# Patient Record
Sex: FEMALE | Race: WHITE | NOT HISPANIC OR LATINO
[De-identification: names, ages, dates, MRNs, and addresses within clinical notes are randomized per-mention and may not be internally consistent; named-entity substitution may affect disease eponyms.]

---

## 2017-03-06 ENCOUNTER — APPOINTMENT (OUTPATIENT)
Dept: SURGERY | Facility: CLINIC | Age: 62
End: 2017-03-06

## 2017-03-06 ENCOUNTER — APPOINTMENT (OUTPATIENT)
Dept: RADIATION ONCOLOGY | Facility: CLINIC | Age: 62
End: 2017-03-06

## 2017-03-06 VITALS
RESPIRATION RATE: 16 BRPM | TEMPERATURE: 95.8 F | BODY MASS INDEX: 27.08 KG/M2 | DIASTOLIC BLOOD PRESSURE: 87 MMHG | HEART RATE: 78 BPM | HEIGHT: 59 IN | WEIGHT: 134.35 LBS | OXYGEN SATURATION: 98 % | SYSTOLIC BLOOD PRESSURE: 158 MMHG

## 2017-03-27 ENCOUNTER — APPOINTMENT (OUTPATIENT)
Dept: HEMATOLOGY ONCOLOGY | Facility: CLINIC | Age: 62
End: 2017-03-27

## 2017-03-27 ENCOUNTER — OUTPATIENT (OUTPATIENT)
Dept: OUTPATIENT SERVICES | Facility: HOSPITAL | Age: 62
LOS: 1 days | Discharge: HOME | End: 2017-03-27

## 2017-03-27 VITALS
WEIGHT: 136.69 LBS | RESPIRATION RATE: 16 BRPM | SYSTOLIC BLOOD PRESSURE: 140 MMHG | DIASTOLIC BLOOD PRESSURE: 80 MMHG | TEMPERATURE: 98.4 F | OXYGEN SATURATION: 100 % | BODY MASS INDEX: 27.61 KG/M2 | HEART RATE: 67 BPM

## 2017-03-27 DIAGNOSIS — Z98.89 OTHER SPECIFIED POSTPROCEDURAL STATES: Chronic | ICD-10-CM

## 2017-03-27 DIAGNOSIS — C50.912 MALIGNANT NEOPLASM OF UNSPECIFIED SITE OF LEFT FEMALE BREAST: ICD-10-CM

## 2017-03-27 RX ORDER — EXEMESTANE 25 MG/1
25 TABLET, FILM COATED ORAL
Qty: 30 | Refills: 2 | Status: DISCONTINUED | COMMUNITY
Start: 2017-02-27 | End: 2017-03-27

## 2017-04-16 ENCOUNTER — FORM ENCOUNTER (OUTPATIENT)
Age: 62
End: 2017-04-16

## 2017-04-17 ENCOUNTER — APPOINTMENT (OUTPATIENT)
Dept: MAMMOGRAPHY | Facility: IMAGING CENTER | Age: 62
End: 2017-04-17

## 2017-04-17 ENCOUNTER — OUTPATIENT (OUTPATIENT)
Dept: OUTPATIENT SERVICES | Facility: HOSPITAL | Age: 62
LOS: 1 days | End: 2017-04-17
Payer: COMMERCIAL

## 2017-04-17 ENCOUNTER — APPOINTMENT (OUTPATIENT)
Dept: ULTRASOUND IMAGING | Facility: IMAGING CENTER | Age: 62
End: 2017-04-17

## 2017-04-17 DIAGNOSIS — C50.912 MALIGNANT NEOPLASM OF UNSPECIFIED SITE OF LEFT FEMALE BREAST: ICD-10-CM

## 2017-04-17 DIAGNOSIS — Z98.89 OTHER SPECIFIED POSTPROCEDURAL STATES: Chronic | ICD-10-CM

## 2017-04-17 PROCEDURE — G0279: CPT

## 2017-04-17 PROCEDURE — 76641 ULTRASOUND BREAST COMPLETE: CPT

## 2017-04-17 PROCEDURE — 77066 DX MAMMO INCL CAD BI: CPT

## 2017-06-28 ENCOUNTER — APPOINTMENT (OUTPATIENT)
Dept: SURGERY | Facility: CLINIC | Age: 62
End: 2017-06-28

## 2017-07-15 ENCOUNTER — TRANSCRIPTION ENCOUNTER (OUTPATIENT)
Age: 62
End: 2017-07-15

## 2017-08-04 ENCOUNTER — APPOINTMENT (OUTPATIENT)
Dept: RADIATION ONCOLOGY | Facility: CLINIC | Age: 62
End: 2017-08-04
Payer: COMMERCIAL

## 2017-08-04 VITALS
TEMPERATURE: 97.8 F | HEART RATE: 75 BPM | BODY MASS INDEX: 27.53 KG/M2 | SYSTOLIC BLOOD PRESSURE: 153 MMHG | OXYGEN SATURATION: 98 % | RESPIRATION RATE: 16 BRPM | DIASTOLIC BLOOD PRESSURE: 83 MMHG | WEIGHT: 136.57 LBS | HEIGHT: 59 IN

## 2017-08-04 PROCEDURE — 99214 OFFICE O/P EST MOD 30 MIN: CPT | Mod: GC

## 2017-08-04 RX ORDER — TAMOXIFEN CITRATE 20 MG/1
20 TABLET, FILM COATED ORAL DAILY
Qty: 30 | Refills: 2 | Status: DISCONTINUED | COMMUNITY
Start: 2017-03-27 | End: 2017-08-04

## 2017-08-07 ENCOUNTER — OUTPATIENT (OUTPATIENT)
Dept: OUTPATIENT SERVICES | Facility: HOSPITAL | Age: 62
LOS: 1 days | Discharge: HOME | End: 2017-08-07

## 2017-08-07 DIAGNOSIS — Z98.89 OTHER SPECIFIED POSTPROCEDURAL STATES: Chronic | ICD-10-CM

## 2017-08-07 DIAGNOSIS — D05.10 INTRADUCTAL CARCINOMA IN SITU OF UNSPECIFIED BREAST: ICD-10-CM

## 2017-08-07 DIAGNOSIS — C50.912 MALIGNANT NEOPLASM OF UNSPECIFIED SITE OF LEFT FEMALE BREAST: ICD-10-CM

## 2017-08-11 ENCOUNTER — APPOINTMENT (OUTPATIENT)
Dept: HEMATOLOGY ONCOLOGY | Facility: CLINIC | Age: 62
End: 2017-08-11

## 2017-10-02 ENCOUNTER — OUTPATIENT (OUTPATIENT)
Dept: OUTPATIENT SERVICES | Facility: HOSPITAL | Age: 62
LOS: 1 days | Discharge: HOME | End: 2017-10-02

## 2017-10-02 DIAGNOSIS — Z98.89 OTHER SPECIFIED POSTPROCEDURAL STATES: Chronic | ICD-10-CM

## 2017-10-02 DIAGNOSIS — C50.912 MALIGNANT NEOPLASM OF UNSPECIFIED SITE OF LEFT FEMALE BREAST: ICD-10-CM

## 2017-10-04 ENCOUNTER — APPOINTMENT (OUTPATIENT)
Dept: HEMATOLOGY ONCOLOGY | Facility: CLINIC | Age: 62
End: 2017-10-04
Payer: COMMERCIAL

## 2017-10-04 VITALS
RESPIRATION RATE: 16 BRPM | TEMPERATURE: 98.4 F | WEIGHT: 137.35 LBS | HEART RATE: 72 BPM | OXYGEN SATURATION: 99 % | SYSTOLIC BLOOD PRESSURE: 162 MMHG | BODY MASS INDEX: 27.74 KG/M2 | DIASTOLIC BLOOD PRESSURE: 91 MMHG

## 2017-10-04 PROCEDURE — 99213 OFFICE O/P EST LOW 20 MIN: CPT

## 2017-10-05 ENCOUNTER — RESULT REVIEW (OUTPATIENT)
Age: 62
End: 2017-10-05

## 2017-10-05 ENCOUNTER — OTHER (OUTPATIENT)
Age: 62
End: 2017-10-05

## 2017-10-12 ENCOUNTER — TRANSCRIPTION ENCOUNTER (OUTPATIENT)
Age: 62
End: 2017-10-12

## 2017-10-19 ENCOUNTER — APPOINTMENT (OUTPATIENT)
Dept: NEUROLOGY | Facility: CLINIC | Age: 62
End: 2017-10-19

## 2017-11-07 ENCOUNTER — RESULT REVIEW (OUTPATIENT)
Age: 62
End: 2017-11-07

## 2017-11-14 ENCOUNTER — FORM ENCOUNTER (OUTPATIENT)
Age: 62
End: 2017-11-14

## 2017-11-15 ENCOUNTER — APPOINTMENT (OUTPATIENT)
Dept: CT IMAGING | Facility: IMAGING CENTER | Age: 62
End: 2017-11-15
Payer: COMMERCIAL

## 2017-11-15 ENCOUNTER — APPOINTMENT (OUTPATIENT)
Dept: UROLOGY | Facility: CLINIC | Age: 62
End: 2017-11-15
Payer: COMMERCIAL

## 2017-11-15 ENCOUNTER — OUTPATIENT (OUTPATIENT)
Dept: OUTPATIENT SERVICES | Facility: HOSPITAL | Age: 62
LOS: 1 days | End: 2017-11-15
Payer: COMMERCIAL

## 2017-11-15 VITALS
WEIGHT: 134 LBS | DIASTOLIC BLOOD PRESSURE: 81 MMHG | TEMPERATURE: 98.1 F | HEIGHT: 59 IN | RESPIRATION RATE: 16 BRPM | BODY MASS INDEX: 27.01 KG/M2 | SYSTOLIC BLOOD PRESSURE: 159 MMHG | HEART RATE: 74 BPM

## 2017-11-15 DIAGNOSIS — R31.29 OTHER MICROSCOPIC HEMATURIA: ICD-10-CM

## 2017-11-15 DIAGNOSIS — Z98.89 OTHER SPECIFIED POSTPROCEDURAL STATES: Chronic | ICD-10-CM

## 2017-11-15 PROCEDURE — 74178 CT ABD&PLV WO CNTR FLWD CNTR: CPT

## 2017-11-15 PROCEDURE — 99204 OFFICE O/P NEW MOD 45 MIN: CPT

## 2017-11-15 PROCEDURE — 74178 CT ABD&PLV WO CNTR FLWD CNTR: CPT | Mod: 26

## 2017-11-15 PROCEDURE — 82565 ASSAY OF CREATININE: CPT

## 2017-11-17 LAB
APPEARANCE: CLEAR
BACTERIA UR CULT: NORMAL
BACTERIA: NEGATIVE
BILIRUBIN URINE: NEGATIVE
BLOOD URINE: NEGATIVE
COLOR: YELLOW
GLUCOSE QUALITATIVE U: NEGATIVE MG/DL
HYALINE CASTS: 0 /LPF
KETONES URINE: NEGATIVE
LEUKOCYTE ESTERASE URINE: NEGATIVE
MICROSCOPIC-UA: NORMAL
NITRITE URINE: NEGATIVE
PH URINE: 5.5
PROTEIN URINE: NEGATIVE MG/DL
RED BLOOD CELLS URINE: 2 /HPF
SPECIFIC GRAVITY URINE: 1.02
SQUAMOUS EPITHELIAL CELLS: 1 /HPF
UROBILINOGEN URINE: NEGATIVE MG/DL
WHITE BLOOD CELLS URINE: 1 /HPF

## 2017-11-20 ENCOUNTER — OUTPATIENT (OUTPATIENT)
Dept: OUTPATIENT SERVICES | Facility: HOSPITAL | Age: 62
LOS: 1 days | End: 2017-11-20
Payer: COMMERCIAL

## 2017-11-20 ENCOUNTER — APPOINTMENT (OUTPATIENT)
Dept: UROLOGY | Facility: CLINIC | Age: 62
End: 2017-11-20
Payer: COMMERCIAL

## 2017-11-20 VITALS
DIASTOLIC BLOOD PRESSURE: 89 MMHG | SYSTOLIC BLOOD PRESSURE: 149 MMHG | TEMPERATURE: 98.3 F | HEART RATE: 81 BPM | RESPIRATION RATE: 16 BRPM

## 2017-11-20 DIAGNOSIS — Z98.89 OTHER SPECIFIED POSTPROCEDURAL STATES: Chronic | ICD-10-CM

## 2017-11-20 DIAGNOSIS — R82.99 OTHER ABNORMAL FINDINGS IN URINE: ICD-10-CM

## 2017-11-20 DIAGNOSIS — R31.29 OTHER MICROSCOPIC HEMATURIA: ICD-10-CM

## 2017-11-20 DIAGNOSIS — N39.0 URINARY TRACT INFECTION, SITE NOT SPECIFIED: ICD-10-CM

## 2017-11-20 PROCEDURE — 52000 CYSTOURETHROSCOPY: CPT

## 2017-11-21 DIAGNOSIS — N39.0 URINARY TRACT INFECTION, SITE NOT SPECIFIED: ICD-10-CM

## 2017-11-21 DIAGNOSIS — R31.29 OTHER MICROSCOPIC HEMATURIA: ICD-10-CM

## 2017-11-21 DIAGNOSIS — R82.99 OTHER ABNORMAL FINDINGS IN URINE: ICD-10-CM

## 2017-12-18 ENCOUNTER — APPOINTMENT (OUTPATIENT)
Dept: SURGERY | Facility: CLINIC | Age: 62
End: 2017-12-18
Payer: COMMERCIAL

## 2017-12-18 PROCEDURE — 99214 OFFICE O/P EST MOD 30 MIN: CPT

## 2017-12-23 ENCOUNTER — EMERGENCY (EMERGENCY)
Facility: HOSPITAL | Age: 62
LOS: 1 days | Discharge: HOME | End: 2017-12-23
Attending: EMERGENCY MEDICINE | Admitting: EMERGENCY MEDICINE
Payer: COMMERCIAL

## 2017-12-23 VITALS
TEMPERATURE: 98 F | HEART RATE: 72 BPM | SYSTOLIC BLOOD PRESSURE: 163 MMHG | DIASTOLIC BLOOD PRESSURE: 94 MMHG | OXYGEN SATURATION: 99 % | RESPIRATION RATE: 18 BRPM

## 2017-12-23 DIAGNOSIS — Z98.89 OTHER SPECIFIED POSTPROCEDURAL STATES: Chronic | ICD-10-CM

## 2017-12-23 LAB
ALBUMIN SERPL ELPH-MCNC: 4.2 G/DL — SIGNIFICANT CHANGE UP (ref 3.3–5)
ALP SERPL-CCNC: 65 U/L — SIGNIFICANT CHANGE UP (ref 40–120)
ALT FLD-CCNC: 20 U/L — SIGNIFICANT CHANGE UP (ref 4–33)
APPEARANCE UR: CLEAR — SIGNIFICANT CHANGE UP
AST SERPL-CCNC: 22 U/L — SIGNIFICANT CHANGE UP (ref 4–32)
BASOPHILS # BLD AUTO: 0.03 K/UL — SIGNIFICANT CHANGE UP (ref 0–0.2)
BASOPHILS NFR BLD AUTO: 0.4 % — SIGNIFICANT CHANGE UP (ref 0–2)
BILIRUB SERPL-MCNC: 0.2 MG/DL — SIGNIFICANT CHANGE UP (ref 0.2–1.2)
BILIRUB UR-MCNC: NEGATIVE — SIGNIFICANT CHANGE UP
BLOOD UR QL VISUAL: NEGATIVE — SIGNIFICANT CHANGE UP
BUN SERPL-MCNC: 19 MG/DL — SIGNIFICANT CHANGE UP (ref 7–23)
CALCIUM SERPL-MCNC: 9.4 MG/DL — SIGNIFICANT CHANGE UP (ref 8.4–10.5)
CHLORIDE SERPL-SCNC: 104 MMOL/L — SIGNIFICANT CHANGE UP (ref 98–107)
CO2 SERPL-SCNC: 25 MMOL/L — SIGNIFICANT CHANGE UP (ref 22–31)
COLOR SPEC: YELLOW — SIGNIFICANT CHANGE UP
CREAT SERPL-MCNC: 0.72 MG/DL — SIGNIFICANT CHANGE UP (ref 0.5–1.3)
EOSINOPHIL # BLD AUTO: 0.08 K/UL — SIGNIFICANT CHANGE UP (ref 0–0.5)
EOSINOPHIL NFR BLD AUTO: 1.1 % — SIGNIFICANT CHANGE UP (ref 0–6)
GLUCOSE SERPL-MCNC: 103 MG/DL — HIGH (ref 70–99)
GLUCOSE UR-MCNC: NEGATIVE — SIGNIFICANT CHANGE UP
HCT VFR BLD CALC: 42.1 % — SIGNIFICANT CHANGE UP (ref 34.5–45)
HGB BLD-MCNC: 13.8 G/DL — SIGNIFICANT CHANGE UP (ref 11.5–15.5)
HYALINE CASTS # UR AUTO: SIGNIFICANT CHANGE UP (ref 0–?)
IMM GRANULOCYTES # BLD AUTO: 0.04 # — SIGNIFICANT CHANGE UP
IMM GRANULOCYTES NFR BLD AUTO: 0.5 % — SIGNIFICANT CHANGE UP (ref 0–1.5)
KETONES UR-MCNC: NEGATIVE — SIGNIFICANT CHANGE UP
LEUKOCYTE ESTERASE UR-ACNC: NEGATIVE — SIGNIFICANT CHANGE UP
LYMPHOCYTES # BLD AUTO: 0.93 K/UL — LOW (ref 1–3.3)
LYMPHOCYTES # BLD AUTO: 12.2 % — LOW (ref 13–44)
MAGNESIUM SERPL-MCNC: 1.9 MG/DL — SIGNIFICANT CHANGE UP (ref 1.6–2.6)
MCHC RBC-ENTMCNC: 29.1 PG — SIGNIFICANT CHANGE UP (ref 27–34)
MCHC RBC-ENTMCNC: 32.8 % — SIGNIFICANT CHANGE UP (ref 32–36)
MCV RBC AUTO: 88.6 FL — SIGNIFICANT CHANGE UP (ref 80–100)
MONOCYTES # BLD AUTO: 0.35 K/UL — SIGNIFICANT CHANGE UP (ref 0–0.9)
MONOCYTES NFR BLD AUTO: 4.6 % — SIGNIFICANT CHANGE UP (ref 2–14)
MUCOUS THREADS # UR AUTO: SIGNIFICANT CHANGE UP
NEUTROPHILS # BLD AUTO: 6.17 K/UL — SIGNIFICANT CHANGE UP (ref 1.8–7.4)
NEUTROPHILS NFR BLD AUTO: 81.2 % — HIGH (ref 43–77)
NITRITE UR-MCNC: NEGATIVE — SIGNIFICANT CHANGE UP
NON-SQ EPI CELLS # UR AUTO: <1 — SIGNIFICANT CHANGE UP
NRBC # FLD: 0 — SIGNIFICANT CHANGE UP
PH UR: 6 — SIGNIFICANT CHANGE UP (ref 4.6–8)
PHOSPHATE SERPL-MCNC: 3.8 MG/DL — SIGNIFICANT CHANGE UP (ref 2.5–4.5)
PLATELET # BLD AUTO: 271 K/UL — SIGNIFICANT CHANGE UP (ref 150–400)
PMV BLD: 10.7 FL — SIGNIFICANT CHANGE UP (ref 7–13)
POTASSIUM SERPL-MCNC: 4.3 MMOL/L — SIGNIFICANT CHANGE UP (ref 3.5–5.3)
POTASSIUM SERPL-SCNC: 4.3 MMOL/L — SIGNIFICANT CHANGE UP (ref 3.5–5.3)
PROT SERPL-MCNC: 7.1 G/DL — SIGNIFICANT CHANGE UP (ref 6–8.3)
PROT UR-MCNC: NEGATIVE MG/DL — SIGNIFICANT CHANGE UP
RBC # BLD: 4.75 M/UL — SIGNIFICANT CHANGE UP (ref 3.8–5.2)
RBC # FLD: 12.5 % — SIGNIFICANT CHANGE UP (ref 10.3–14.5)
RBC CASTS # UR COMP ASSIST: SIGNIFICANT CHANGE UP (ref 0–?)
SODIUM SERPL-SCNC: 140 MMOL/L — SIGNIFICANT CHANGE UP (ref 135–145)
SP GR SPEC: 1.02 — SIGNIFICANT CHANGE UP (ref 1–1.04)
SQUAMOUS # UR AUTO: SIGNIFICANT CHANGE UP
UROBILINOGEN FLD QL: NORMAL MG/DL — SIGNIFICANT CHANGE UP
WBC # BLD: 7.6 K/UL — SIGNIFICANT CHANGE UP (ref 3.8–10.5)
WBC # FLD AUTO: 7.6 K/UL — SIGNIFICANT CHANGE UP (ref 3.8–10.5)
WBC UR QL: SIGNIFICANT CHANGE UP (ref 0–?)

## 2017-12-23 PROCEDURE — 99284 EMERGENCY DEPT VISIT MOD MDM: CPT

## 2017-12-23 RX ORDER — DIPHENHYDRAMINE HCL 50 MG
25 CAPSULE ORAL ONCE
Qty: 0 | Refills: 0 | Status: COMPLETED | OUTPATIENT
Start: 2017-12-23 | End: 2017-12-23

## 2017-12-23 RX ADMIN — Medication 25 MILLIGRAM(S): at 10:25

## 2017-12-23 NOTE — ED ADULT TRIAGE NOTE - CHIEF COMPLAINT QUOTE
pt coming with facial numbness, with some right hand fingers numbness and itching feeling started  3-4 wks, denies weakness to upper and lower extremities gait stable, ,

## 2017-12-23 NOTE — ED PROVIDER NOTE - ATTENDING CONTRIBUTION TO CARE
I performed a history and physical exam of the patient and discussed their management with the resident and /or advanced care provider. I reviewed the resident and /or ACP's note and agree with the documented findings and plan of care. My medical decision making and observations are found above.  Abd soft, non focal neuro exam.

## 2017-12-23 NOTE — ED PROVIDER NOTE - MEDICAL DECISION MAKING DETAILS
Marisel: Patient after intestinal surgery with decreased appetite and weakness. No focal weakness. Not eating or drinking, +BM. abd soft. 62F h/o breast ca (2016) s/p lumpectomy/RT in remission, anxiety disorder presented to [facility] ED c/o b/l facial numbness/tingling. Unlikely to be a stroke/mass given that patient has b/l facial sxs. Neuro exam fully intact. DDx: anxiety vs electrolyte abnormalities vs allergic rxn vs MS (unlikely given patient's age). Will check labs, UA. Give 25mg PO benadryl and re-assess. 62F h/o breast ca (2016) s/p lumpectomy/RT in remission, anxiety disorder presented to [facility] ED c/o b/l facial numbness/tingling. Unlikely to be a stroke/mass given that patient has b/l facial sxs. Neuro exam fully intact. DDx: anxiety vs electrolyte abnormalities vs allergic rxn vs MS (unlikely given patient's age). Will check labs, UA. Give 25mg PO benadryl and re-assess.  Marisel: no focal neuro changes, motion and feeling intact. Feeling of tingling bilateral. With hx of Ca will check Lytes. No CT needed. discussed with pt.

## 2017-12-23 NOTE — ED PROVIDER NOTE - PROGRESS NOTE DETAILS
Patient received benadryl 25 with some improvement. Labs/workup negative. Will discharge patient with follow up with PMD/neurology. It may be possible that she had an mild allergic reaction to her new makeup. Advised patient to change the brand and continue to watch for recurrent symptoms. Robby

## 2017-12-23 NOTE — ED ADULT NURSE NOTE - OBJECTIVE STATEMENT
Pt c/o facial numbness and tingling x2-3 weeks with intermittent tingling to jessika arms.  Says symptoms got worse last night.  PMH anxiety.  MD at bedside Pt c/o facial numbness and tingling x2-3 weeks with intermittent tingling to jessika arms.  Says symptoms got worse last night.  No neuro deficits noted.  PMH anxiety.  MD at bedside

## 2017-12-23 NOTE — ED PROVIDER NOTE - OBJECTIVE STATEMENT
62F h/o breast ca (2016) s/p lumpectomy/RT in remission presented to [facility] ED c/o b/l facial numbness/tingling. Patient had this episode two weeks ago, at the time, it lasted about 25 minutes. She was watching a [employer] movie at the time. She again felt facial numbness/tingling today. It started this morning when she woke up. ROS + for morning nausea, L eye cataract. Denies fever, chills, cp, sob, n/v/d/c, dysuria, hematuria, abdominal pain, arm weakness/numbness. Of note, she had a CT abd/pelvis with contrast 11/15/17 for hematuria and is concerned about the side effects of IV dye. She also takes many vitamins. 62F h/o breast ca (2016) s/p lumpectomy/RT in remission, anxiety disorder presented to [facility] ED c/o b/l facial numbness/tingling. Patient had this episode two weeks ago, at the time, it lasted about 25 minutes. She was watching a [employer] movie at the time. She again felt facial numbness/tingling today. It started this morning when she woke up. ROS + for morning nausea, L eye cataract. Denies fever, chills, cp, sob, n/v/d/c, dysuria, hematuria, abdominal pain, arm weakness/numbness. Of note, she had a CT abd/pelvis with contrast 11/15/17 for hematuria (now resolved) and is concerned about the side effects of IV dye. She also takes many vitamins (C, E, A, D, B12, magnesium). She is also worried that she may have a tumor in her brain. She expresses generalized anxiety (anxious about her children, , and many other things). 62F h/o breast ca (2016) s/p lumpectomy/RT in remission, anxiety disorder presented to [facility] ED c/o b/l facial numbness/tingling. Patient had this episode two weeks ago, at the time, it lasted about 25 minutes. She was watching a [employer] movie at the time. She again felt facial numbness/tingling today. It started this morning when she woke up. ROS + for morning nausea, L eye cataract. Denies fever, chills, cp, sob, n/v/d/c, dysuria, hematuria, abdominal pain, arm weakness/numbness. Of note, she had a CT abd/pelvis with contrast 11/15/17 for hematuria (now resolved) and is concerned about the side effects of IV dye. She also takes many vitamins (C, E, A, D, B12, magnesium). She is also worried that she may have a tumor in her brain. She expresses generalized anxiety (anxious about her children, , and many other things). She also mentions that she used a different makeup brand recently and wonders if it is an allergic reaction

## 2018-02-02 ENCOUNTER — APPOINTMENT (OUTPATIENT)
Dept: NEUROLOGY | Facility: CLINIC | Age: 63
End: 2018-02-02
Payer: COMMERCIAL

## 2018-02-02 VITALS
BODY MASS INDEX: 27.62 KG/M2 | DIASTOLIC BLOOD PRESSURE: 89 MMHG | HEIGHT: 59 IN | SYSTOLIC BLOOD PRESSURE: 154 MMHG | WEIGHT: 137 LBS | HEART RATE: 67 BPM

## 2018-02-02 DIAGNOSIS — R20.2 ANESTHESIA OF SKIN: ICD-10-CM

## 2018-02-02 DIAGNOSIS — R20.0 ANESTHESIA OF SKIN: ICD-10-CM

## 2018-02-02 PROCEDURE — 99204 OFFICE O/P NEW MOD 45 MIN: CPT

## 2018-02-23 ENCOUNTER — APPOINTMENT (OUTPATIENT)
Dept: RADIATION ONCOLOGY | Facility: CLINIC | Age: 63
End: 2018-02-23
Payer: COMMERCIAL

## 2018-02-23 VITALS
RESPIRATION RATE: 15 BRPM | DIASTOLIC BLOOD PRESSURE: 94 MMHG | BODY MASS INDEX: 27.81 KG/M2 | HEART RATE: 69 BPM | WEIGHT: 137.68 LBS | SYSTOLIC BLOOD PRESSURE: 177 MMHG | OXYGEN SATURATION: 99 % | TEMPERATURE: 98.4 F

## 2018-02-23 PROCEDURE — 99213 OFFICE O/P EST LOW 20 MIN: CPT

## 2018-04-17 ENCOUNTER — FORM ENCOUNTER (OUTPATIENT)
Age: 63
End: 2018-04-17

## 2018-04-18 ENCOUNTER — APPOINTMENT (OUTPATIENT)
Dept: ULTRASOUND IMAGING | Facility: IMAGING CENTER | Age: 63
End: 2018-04-18
Payer: COMMERCIAL

## 2018-04-18 ENCOUNTER — APPOINTMENT (OUTPATIENT)
Dept: MAMMOGRAPHY | Facility: IMAGING CENTER | Age: 63
End: 2018-04-18
Payer: COMMERCIAL

## 2018-04-18 ENCOUNTER — OUTPATIENT (OUTPATIENT)
Dept: OUTPATIENT SERVICES | Facility: HOSPITAL | Age: 63
LOS: 1 days | End: 2018-04-18
Payer: COMMERCIAL

## 2018-04-18 DIAGNOSIS — Z98.89 OTHER SPECIFIED POSTPROCEDURAL STATES: Chronic | ICD-10-CM

## 2018-04-18 DIAGNOSIS — C50.912 MALIGNANT NEOPLASM OF UNSPECIFIED SITE OF LEFT FEMALE BREAST: ICD-10-CM

## 2018-04-18 PROCEDURE — 77066 DX MAMMO INCL CAD BI: CPT

## 2018-04-18 PROCEDURE — G0279: CPT | Mod: 26

## 2018-04-18 PROCEDURE — 76641 ULTRASOUND BREAST COMPLETE: CPT | Mod: 26,50

## 2018-04-18 PROCEDURE — 76641 ULTRASOUND BREAST COMPLETE: CPT

## 2018-04-18 PROCEDURE — 77066 DX MAMMO INCL CAD BI: CPT | Mod: 26

## 2018-04-18 PROCEDURE — G0279: CPT

## 2018-05-31 ENCOUNTER — TRANSCRIPTION ENCOUNTER (OUTPATIENT)
Age: 63
End: 2018-05-31

## 2018-06-18 ENCOUNTER — APPOINTMENT (OUTPATIENT)
Dept: SURGERY | Facility: CLINIC | Age: 63
End: 2018-06-18
Payer: COMMERCIAL

## 2018-06-18 PROCEDURE — 99214 OFFICE O/P EST MOD 30 MIN: CPT

## 2018-06-18 RX ORDER — ERYTHROMYCIN 5 MG/G
5 OINTMENT OPHTHALMIC
Qty: 4 | Refills: 0 | Status: DISCONTINUED | COMMUNITY
Start: 2017-11-07

## 2018-06-18 RX ORDER — CIPROFLOXACIN HYDROCHLORIDE 500 MG/1
500 TABLET, FILM COATED ORAL
Qty: 6 | Refills: 0 | Status: DISCONTINUED | COMMUNITY
Start: 2017-11-07

## 2018-06-22 ENCOUNTER — TRANSCRIPTION ENCOUNTER (OUTPATIENT)
Age: 63
End: 2018-06-22

## 2018-10-02 ENCOUNTER — TRANSCRIPTION ENCOUNTER (OUTPATIENT)
Age: 63
End: 2018-10-02

## 2018-12-12 ENCOUNTER — FORM ENCOUNTER (OUTPATIENT)
Age: 63
End: 2018-12-12

## 2018-12-12 ENCOUNTER — APPOINTMENT (OUTPATIENT)
Dept: SURGERY | Facility: CLINIC | Age: 63
End: 2018-12-12
Payer: COMMERCIAL

## 2018-12-12 PROBLEM — C50.919 MALIGNANT NEOPLASM OF UNSPECIFIED SITE OF UNSPECIFIED FEMALE BREAST: Chronic | Status: ACTIVE | Noted: 2017-12-23

## 2018-12-12 PROCEDURE — 99214 OFFICE O/P EST MOD 30 MIN: CPT

## 2018-12-13 ENCOUNTER — APPOINTMENT (OUTPATIENT)
Dept: ULTRASOUND IMAGING | Facility: CLINIC | Age: 63
End: 2018-12-13
Payer: COMMERCIAL

## 2018-12-13 ENCOUNTER — OUTPATIENT (OUTPATIENT)
Dept: OUTPATIENT SERVICES | Facility: HOSPITAL | Age: 63
LOS: 1 days | End: 2018-12-13
Payer: COMMERCIAL

## 2018-12-13 DIAGNOSIS — D05.10 INTRADUCTAL CARCINOMA IN SITU OF UNSPECIFIED BREAST: ICD-10-CM

## 2018-12-13 DIAGNOSIS — Z98.89 OTHER SPECIFIED POSTPROCEDURAL STATES: Chronic | ICD-10-CM

## 2018-12-13 DIAGNOSIS — E04.1 NONTOXIC SINGLE THYROID NODULE: ICD-10-CM

## 2018-12-13 DIAGNOSIS — C50.912 MALIGNANT NEOPLASM OF UNSPECIFIED SITE OF LEFT FEMALE BREAST: ICD-10-CM

## 2018-12-13 PROCEDURE — 76536 US EXAM OF HEAD AND NECK: CPT | Mod: 26

## 2018-12-13 PROCEDURE — 76536 US EXAM OF HEAD AND NECK: CPT

## 2019-01-14 ENCOUNTER — APPOINTMENT (OUTPATIENT)
Dept: SURGERY | Facility: CLINIC | Age: 64
End: 2019-01-14
Payer: COMMERCIAL

## 2019-01-14 ENCOUNTER — LABORATORY RESULT (OUTPATIENT)
Age: 64
End: 2019-01-14

## 2019-01-14 PROCEDURE — 76942 ECHO GUIDE FOR BIOPSY: CPT | Mod: 59

## 2019-01-14 PROCEDURE — 10005 FNA BX W/US GDN 1ST LES: CPT

## 2019-01-14 PROCEDURE — 99213 OFFICE O/P EST LOW 20 MIN: CPT | Mod: 25

## 2019-01-14 NOTE — PHYSICAL EXAM
[Normocephalic] : normocephalic [EOMI] : extra ocular movement intact [Sclera nonicteric] : sclera nonicteric [Supple] : supple [No Supraclavicular Adenopathy] : no supraclavicular adenopathy [No Cervical Adenopathy] : no cervical adenopathy [No Thyromegaly] : no thyromegaly [Symmetrical] : symmetrical [No dominant masses] : no dominant masses in right breast  [No dominant masses] : no dominant masses left breast [No Nipple Retraction] : no left nipple retraction [No Nipple Discharge] : no left nipple discharge [No Axillary Lymphadenopathy] : no left axillary lymphadenopathy [Soft] : abdomen soft [No Swelling] : no swelling [No Rashes] : no rashes [de-identified] : right thyroid nodule smooth, nt ~1.5 cm, left 2 cm nodule [de-identified] : Incision well healed. No dominant or suspicious mass.Left inferior areolar changes in skin most likely due to radiation healing. No suspicious findings.

## 2019-01-14 NOTE — HISTORY OF PRESENT ILLNESS
[FreeTextEntry1] : Patient underwent left partial mastectomy with sentinel lymph node excision May 13, 2016. Diagnosed with 9 mm invasive ductal carcinoma ER+, MN+, 6/9 SBR. Margins negative.Patient denies pain or drainage. Patient completed course of radiation. Now on anastrozole. Patient reports lumpiness in mild sensitivity in left breast.\par Bilateral mammogram and ultrasound April 2017 without suspicious findings. Patient stopped anastrozole due to lump sensation and throat. denies recent illness or palpable mass.\par 4/2018 jessika mammo and US BIRADS 2.  denies recent illness. . Patient reports throat discomfort with sinus issues.  Denies breast mass or d/c.  \par Patient returns prior to scheduled f/u for US guided FNA dominant nodule.  recent US with jessika nodules for biopsy.  denies dysphagia, change in voice

## 2019-01-14 NOTE — ASSESSMENT
[FreeTextEntry1] : MNG with dom left nodule. US guided FNA performed if benign, f/u US 6/2019 RTo  6mo

## 2019-01-16 ENCOUNTER — APPOINTMENT (OUTPATIENT)
Dept: RADIATION ONCOLOGY | Facility: CLINIC | Age: 64
End: 2019-01-16

## 2019-04-18 ENCOUNTER — FORM ENCOUNTER (OUTPATIENT)
Age: 64
End: 2019-04-18

## 2019-04-19 ENCOUNTER — APPOINTMENT (OUTPATIENT)
Dept: ULTRASOUND IMAGING | Facility: IMAGING CENTER | Age: 64
End: 2019-04-19
Payer: COMMERCIAL

## 2019-04-19 ENCOUNTER — APPOINTMENT (OUTPATIENT)
Dept: MAMMOGRAPHY | Facility: IMAGING CENTER | Age: 64
End: 2019-04-19
Payer: COMMERCIAL

## 2019-04-19 ENCOUNTER — OUTPATIENT (OUTPATIENT)
Dept: OUTPATIENT SERVICES | Facility: HOSPITAL | Age: 64
LOS: 1 days | End: 2019-04-19
Payer: COMMERCIAL

## 2019-04-19 DIAGNOSIS — Z98.89 OTHER SPECIFIED POSTPROCEDURAL STATES: Chronic | ICD-10-CM

## 2019-04-19 DIAGNOSIS — D05.10 INTRADUCTAL CARCINOMA IN SITU OF UNSPECIFIED BREAST: ICD-10-CM

## 2019-04-19 DIAGNOSIS — C50.912 MALIGNANT NEOPLASM OF UNSPECIFIED SITE OF LEFT FEMALE BREAST: ICD-10-CM

## 2019-04-19 DIAGNOSIS — E04.1 NONTOXIC SINGLE THYROID NODULE: ICD-10-CM

## 2019-04-19 PROCEDURE — 76641 ULTRASOUND BREAST COMPLETE: CPT | Mod: 26,50

## 2019-04-19 PROCEDURE — 77066 DX MAMMO INCL CAD BI: CPT | Mod: 26

## 2019-04-19 PROCEDURE — 76641 ULTRASOUND BREAST COMPLETE: CPT

## 2019-04-19 PROCEDURE — 77066 DX MAMMO INCL CAD BI: CPT

## 2019-04-19 PROCEDURE — G0279: CPT | Mod: 26

## 2019-04-19 PROCEDURE — G0279: CPT

## 2019-06-05 ENCOUNTER — FORM ENCOUNTER (OUTPATIENT)
Age: 64
End: 2019-06-05

## 2019-06-06 ENCOUNTER — APPOINTMENT (OUTPATIENT)
Dept: ULTRASOUND IMAGING | Facility: IMAGING CENTER | Age: 64
End: 2019-06-06
Payer: COMMERCIAL

## 2019-06-06 ENCOUNTER — OUTPATIENT (OUTPATIENT)
Dept: OUTPATIENT SERVICES | Facility: HOSPITAL | Age: 64
LOS: 1 days | End: 2019-06-06
Payer: COMMERCIAL

## 2019-06-06 DIAGNOSIS — Z98.89 OTHER SPECIFIED POSTPROCEDURAL STATES: Chronic | ICD-10-CM

## 2019-06-06 DIAGNOSIS — E04.2 NONTOXIC MULTINODULAR GOITER: ICD-10-CM

## 2019-06-06 PROCEDURE — 76536 US EXAM OF HEAD AND NECK: CPT | Mod: 26

## 2019-06-06 PROCEDURE — 76536 US EXAM OF HEAD AND NECK: CPT

## 2019-06-09 ENCOUNTER — TRANSCRIPTION ENCOUNTER (OUTPATIENT)
Age: 64
End: 2019-06-09

## 2019-06-12 ENCOUNTER — APPOINTMENT (OUTPATIENT)
Dept: SURGERY | Facility: CLINIC | Age: 64
End: 2019-06-12
Payer: COMMERCIAL

## 2019-06-12 PROCEDURE — 99214 OFFICE O/P EST MOD 30 MIN: CPT

## 2019-06-12 NOTE — HISTORY OF PRESENT ILLNESS
[FreeTextEntry1] : Patient underwent left partial mastectomy with sentinel lymph node excision May 13, 2016. Diagnosed with 9 mm invasive ductal carcinoma ER+, [place]+, 6/9 SBR. Margins negative.Patient denies pain or drainage. Patient completed course of radiation. Now on anastrozole. Patient reports lumpiness in mild sensitivity in left breast.\par Bilateral mammogram and ultrasound April 2017 without suspicious findings. Patient stopped anastrozole due to lump sensation and throat. denies recent illness or palpable mass.\par 4/2018 jessika mammo and US BIRADS 2.  denies recent illness. . Patient reports throat discomfort with sinus issues.  Denies breast mass or d/c.  \par Patient with left br cancer 3 years ago.  denies br mass or d/c.  biol mammo and US 4/2019 BIRADS 2.  many year hx of MNG with biopsy of left nodule 1/2019 benign.  repeat US 6/2019 stable MNG.  denies dysphagia, hoarseness or SOB.

## 2019-06-12 NOTE — PHYSICAL EXAM
[Normocephalic] : normocephalic [EOMI] : extra ocular movement intact [Sclera nonicteric] : sclera nonicteric [Supple] : supple [No Cervical Adenopathy] : no cervical adenopathy [No Supraclavicular Adenopathy] : no supraclavicular adenopathy [No Thyromegaly] : no thyromegaly [No dominant masses] : no dominant masses in right breast  [Symmetrical] : symmetrical [No dominant masses] : no dominant masses left breast [No Nipple Retraction] : no left nipple retraction [No Nipple Discharge] : no left nipple discharge [No Axillary Lymphadenopathy] : no left axillary lymphadenopathy [Soft] : abdomen soft [No Swelling] : no swelling [No Rashes] : no rashes [de-identified] : right thyroid nodule smooth, nt ~1.5 cm, left 2 cm nodule [de-identified] : Incision well healed. No dominant or suspicious mass.Left inferior areolar changes in skin most likely due to radiation healing. No suspicious findings.

## 2019-07-19 ENCOUNTER — TRANSCRIPTION ENCOUNTER (OUTPATIENT)
Age: 64
End: 2019-07-19

## 2019-08-08 ENCOUNTER — OUTPATIENT (OUTPATIENT)
Dept: OUTPATIENT SERVICES | Facility: HOSPITAL | Age: 64
LOS: 1 days | End: 2019-08-08
Payer: COMMERCIAL

## 2019-08-08 ENCOUNTER — APPOINTMENT (OUTPATIENT)
Dept: ULTRASOUND IMAGING | Facility: IMAGING CENTER | Age: 64
End: 2019-08-08
Payer: COMMERCIAL

## 2019-08-08 DIAGNOSIS — Z98.89 OTHER SPECIFIED POSTPROCEDURAL STATES: Chronic | ICD-10-CM

## 2019-08-08 DIAGNOSIS — R59.0 LOCALIZED ENLARGED LYMPH NODES: ICD-10-CM

## 2019-08-08 PROCEDURE — 76536 US EXAM OF HEAD AND NECK: CPT | Mod: 26

## 2019-08-08 PROCEDURE — 76536 US EXAM OF HEAD AND NECK: CPT

## 2019-08-21 ENCOUNTER — APPOINTMENT (OUTPATIENT)
Dept: ULTRASOUND IMAGING | Facility: IMAGING CENTER | Age: 64
End: 2019-08-21
Payer: COMMERCIAL

## 2019-08-21 ENCOUNTER — OUTPATIENT (OUTPATIENT)
Dept: OUTPATIENT SERVICES | Facility: HOSPITAL | Age: 64
LOS: 1 days | End: 2019-08-21
Payer: COMMERCIAL

## 2019-08-21 DIAGNOSIS — Z98.89 OTHER SPECIFIED POSTPROCEDURAL STATES: Chronic | ICD-10-CM

## 2019-08-21 DIAGNOSIS — Z00.8 ENCOUNTER FOR OTHER GENERAL EXAMINATION: ICD-10-CM

## 2019-08-21 PROCEDURE — 76700 US EXAM ABDOM COMPLETE: CPT

## 2019-08-21 PROCEDURE — 76700 US EXAM ABDOM COMPLETE: CPT | Mod: 26

## 2019-12-06 ENCOUNTER — TRANSCRIPTION ENCOUNTER (OUTPATIENT)
Age: 64
End: 2019-12-06

## 2019-12-11 ENCOUNTER — APPOINTMENT (OUTPATIENT)
Dept: SURGERY | Facility: CLINIC | Age: 64
End: 2019-12-11
Payer: COMMERCIAL

## 2019-12-11 PROCEDURE — 99214 OFFICE O/P EST MOD 30 MIN: CPT

## 2019-12-11 NOTE — PHYSICAL EXAM
[Normocephalic] : normocephalic [EOMI] : extra ocular movement intact [Sclera nonicteric] : sclera nonicteric [No Supraclavicular Adenopathy] : no supraclavicular adenopathy [Supple] : supple [No Thyromegaly] : no thyromegaly [No Cervical Adenopathy] : no cervical adenopathy [Symmetrical] : symmetrical [No dominant masses] : no dominant masses in right breast  [No dominant masses] : no dominant masses left breast [No Nipple Retraction] : no left nipple retraction [No Nipple Discharge] : no left nipple discharge [No Axillary Lymphadenopathy] : no right axillary lymphadenopathy [Soft] : abdomen soft [No Swelling] : no swelling [Full ROM] : full range of motion [de-identified] : right thyroid nodule smooth, nt ~1.5 cm, left 2 cm nodule [No Rashes] : no rashes [de-identified] : Incision well healed. No dominant or suspicious mass.Left inferior areolar changes in skin most likely due to radiation healing. No suspicious findings. retraction upper inner quadrant stable, no evidence of recurrence

## 2019-12-11 NOTE — ASSESSMENT
[FreeTextEntry1] : MNG and hx of breast cancer  no suspicious findings on PE, no evidence of recurrence,  jessika mammo and br US and thyroid US 4/2020,  RTO 6 mo.

## 2019-12-11 NOTE — HISTORY OF PRESENT ILLNESS
[FreeTextEntry1] : Patient underwent left partial mastectomy with sentinel lymph node excision May 13, 2016. Diagnosed with 9 mm invasive ductal carcinoma ER+, LA+, 6/9 SBR. Margins negative. Patient completed course of radiation. Patient stopped anastrozole due to lump sensation and throat.\par Patient with left br cancer 3 years ago.  denies br mass or d/c.  jessika mammo and US 4/2019 BIRADS 2.  many year hx of MNG with biopsy of left nodule 1/2019 benign.  repeat US 6/2019 stable MNG.  denies dysphagia, hoarseness or SOB.

## 2020-04-06 ENCOUNTER — OUTPATIENT (OUTPATIENT)
Dept: OUTPATIENT SERVICES | Facility: HOSPITAL | Age: 65
LOS: 1 days | Discharge: HOME | End: 2020-04-06

## 2020-04-06 DIAGNOSIS — Z98.89 OTHER SPECIFIED POSTPROCEDURAL STATES: Chronic | ICD-10-CM

## 2020-04-06 DIAGNOSIS — C50.912 MALIGNANT NEOPLASM OF UNSPECIFIED SITE OF LEFT FEMALE BREAST: ICD-10-CM

## 2020-04-06 DIAGNOSIS — D05.10 INTRADUCTAL CARCINOMA IN SITU OF UNSPECIFIED BREAST: ICD-10-CM

## 2020-04-13 ENCOUNTER — APPOINTMENT (OUTPATIENT)
Dept: HEMATOLOGY ONCOLOGY | Facility: CLINIC | Age: 65
End: 2020-04-13
Payer: COMMERCIAL

## 2020-04-13 PROCEDURE — 99442: CPT

## 2020-04-13 NOTE — HISTORY OF PRESENT ILLNESS
[Disease: _____________________] : Disease: [unfilled] [T: ___] : T[unfilled] [N: ___] : N[unfilled] [M: ___] : M[unfilled] [AJCC Stage: ____] : AJCC Stage: [unfilled] [de-identified] : Left breast cancer age 60\par s/p Left partial mastectomy & SLN 5/13/16 \par RT with Dr Hailee Barger \par She underwent trial of anastrozole which was tolerated every other day but developed tongue swelling and throat discomfort when taken every day. She had this tongue swelling and throat issue before and told this was due to menopausal symptom. She then tried exemestane which caused intolerable joint discomfort. \par \par She had diagnostic mammogram on 4/2016 of left breast that revealed a 5 mm cluster of fine calcifications in the superior left upper breast 12 :00 axis and additional 5 mm cluster of calcifications. Stereotactic core needle biopsy of calcifications at 12:00 axis revealed fibroadenoma.  The left upper inner calcification biopsy revealed DCIS, solid pattern with high grade nuclear atypia and central necrosis, measuring 2 mm. She had MRI of the breast on 5/3/16 which showed far posterior left upper inner nodule along with biopsy proven DCIS in the posterior left upper inner breast. She had u/s guided biopsy of the lesion on 5/4/16 which showed left breast 11:00 invasive moderately differentiated ductal carcinoma that measured at least 0.7cm. On 5/2016, she underwent left lumpectomy with sentinel lymph node biopsy which showed invasive ductal carcinoma measuring 0.9 cm and DCIS measuring 0.2cm with negative sentinel lymph nodes. She started on anastrozole after completion of radiation on 8/2016. She had brief trial of anastrozole but had adverse effect and stopped medication within months.  [de-identified] : 0.9 cm invasive ductal carcinoma ER > 90% NC>90% HER2 Negative SBR 6/9 0/1 SLN Negative  SBR 6/9 , (DCIS  0.2cm )  [de-identified] : Endocrine therapy: 9/2016 to present: tried tamoxifen, exemestane, and finally on anastrozole  [de-identified] : Since last evaluation in 2017, she has been followed by Dr Dign and had breast imaging done 12/2019. She has occasional right breast tenderness which worries her. She denies any breast changes or skin changes. She has questions about tumor markers and the importance of these testing for breast cancer surveillance.

## 2020-04-13 NOTE — REVIEW OF SYSTEMS
[Skin Rash] : no skin rash [Skin Wound] : no skin wound [Negative] : Allergic/Immunologic [de-identified] : occasional R breast tenderness

## 2020-04-13 NOTE — ASSESSMENT
[FreeTextEntry1] : She is a  63 y/o F with Stage I left breast cancer s/p lumpectomy and sentinel lymph node biopsy. She had tried endocrine therapy but did not tolerate. She is up to date with breast imaging. We reviewed standard of  care for exam and history along with appropriate imaging to evaluate for breast cancer/ recurrence. We reviewed limitations of blood testing with tumor markers to evaluate for breast cancer. Questions answered to her satisfaction. She will have follow up after coronavirus epidemic over.

## 2020-06-15 ENCOUNTER — APPOINTMENT (OUTPATIENT)
Dept: SURGERY | Facility: CLINIC | Age: 65
End: 2020-06-15
Payer: COMMERCIAL

## 2020-06-15 PROCEDURE — 99214 OFFICE O/P EST MOD 30 MIN: CPT

## 2020-06-15 RX ORDER — AMOXICILLIN 500 MG/1
500 CAPSULE ORAL
Qty: 30 | Refills: 0 | Status: DISCONTINUED | COMMUNITY
Start: 2019-12-16

## 2020-06-15 NOTE — PHYSICAL EXAM
[Sclera nonicteric] : sclera nonicteric [Normocephalic] : normocephalic [EOMI] : extra ocular movement intact [No Supraclavicular Adenopathy] : no supraclavicular adenopathy [No Cervical Adenopathy] : no cervical adenopathy [Supple] : supple [Symmetrical] : symmetrical [No Thyromegaly] : no thyromegaly [No dominant masses] : no dominant masses left breast [No dominant masses] : no dominant masses in right breast  [No Nipple Retraction] : no left nipple retraction [No Nipple Discharge] : no left nipple discharge [Soft] : abdomen soft [No Axillary Lymphadenopathy] : no left axillary lymphadenopathy [No Swelling] : no swelling [de-identified] : right thyroid nodule smooth, nt ~1.5 cm, left 2 cm nodule [No Rashes] : no rashes [Full ROM] : full range of motion [de-identified] : Incision well healed. No dominant or suspicious mass.Left inferior areolar changes in skin most likely due to radiation healing. No suspicious findings. retraction upper inner quadrant stable, no evidence of recurrence

## 2020-06-15 NOTE — HISTORY OF PRESENT ILLNESS
[FreeTextEntry1] : Patient underwent left partial mastectomy with sentinel lymph node excision May 13, 2016. Diagnosed with 9 mm invasive ductal carcinoma ER+, VT+, 6/9 SBR. Margins negative. Patient completed course of radiation. Patient stopped anastrozole due to lump sensation and throat.\par Patient with left br cancer 4 years ago.  denies br mass or d/c.  jessika mammo and US 4/2019 BIRADS 2.  many year hx of MNG with biopsy of left nodule 1/2019 benign.  repeat US 6/2019 stable MNG.  denies dysphagia, hoarseness or SOB.  scheduled for imaging later this month due to COVID.  has been well, daughter engaged.

## 2020-06-15 NOTE — ASSESSMENT
[FreeTextEntry1] : MNG and hx of breast cancer  no suspicious findings on PE, no evidence of recurrence,  jessika mammo and br US and thyroid US now ,  RTO 6 mo.  I reviewed the expected course of illness and the intent of current treatment with the patient. I have answered her questions.\par

## 2020-06-22 ENCOUNTER — RESULT REVIEW (OUTPATIENT)
Age: 65
End: 2020-06-22

## 2020-06-22 ENCOUNTER — APPOINTMENT (OUTPATIENT)
Dept: ULTRASOUND IMAGING | Facility: IMAGING CENTER | Age: 65
End: 2020-06-22
Payer: COMMERCIAL

## 2020-06-22 ENCOUNTER — OUTPATIENT (OUTPATIENT)
Dept: OUTPATIENT SERVICES | Facility: HOSPITAL | Age: 65
LOS: 1 days | End: 2020-06-22
Payer: COMMERCIAL

## 2020-06-22 ENCOUNTER — APPOINTMENT (OUTPATIENT)
Dept: MAMMOGRAPHY | Facility: IMAGING CENTER | Age: 65
End: 2020-06-22
Payer: COMMERCIAL

## 2020-06-22 DIAGNOSIS — Z98.89 OTHER SPECIFIED POSTPROCEDURAL STATES: Chronic | ICD-10-CM

## 2020-06-22 DIAGNOSIS — C50.912 MALIGNANT NEOPLASM OF UNSPECIFIED SITE OF LEFT FEMALE BREAST: ICD-10-CM

## 2020-06-22 PROCEDURE — 76536 US EXAM OF HEAD AND NECK: CPT | Mod: 26

## 2020-06-22 PROCEDURE — 76641 ULTRASOUND BREAST COMPLETE: CPT | Mod: 26,50

## 2020-06-22 PROCEDURE — G0279: CPT | Mod: 26

## 2020-06-22 PROCEDURE — 77066 DX MAMMO INCL CAD BI: CPT

## 2020-06-22 PROCEDURE — 77066 DX MAMMO INCL CAD BI: CPT | Mod: 26

## 2020-06-22 PROCEDURE — G0279: CPT

## 2020-06-22 PROCEDURE — 76641 ULTRASOUND BREAST COMPLETE: CPT

## 2020-06-22 PROCEDURE — 76536 US EXAM OF HEAD AND NECK: CPT

## 2021-01-07 ENCOUNTER — APPOINTMENT (OUTPATIENT)
Dept: SURGERY | Facility: CLINIC | Age: 66
End: 2021-01-07
Payer: MEDICARE

## 2021-01-07 PROCEDURE — 99213 OFFICE O/P EST LOW 20 MIN: CPT

## 2021-01-07 NOTE — PHYSICAL EXAM
[Normocephalic] : normocephalic [EOMI] : extra ocular movement intact [Sclera nonicteric] : sclera nonicteric [Supple] : supple [No Supraclavicular Adenopathy] : no supraclavicular adenopathy [No Cervical Adenopathy] : no cervical adenopathy [No Thyromegaly] : no thyromegaly [Symmetrical] : symmetrical [No dominant masses] : no dominant masses in right breast  [No dominant masses] : no dominant masses left breast [No Nipple Retraction] : no left nipple retraction [No Nipple Discharge] : no left nipple discharge [No Axillary Lymphadenopathy] : no left axillary lymphadenopathy [Soft] : abdomen soft [No Swelling] : no swelling [Full ROM] : full range of motion [No Rashes] : no rashes [de-identified] : right thyroid nodule smooth, nt ~1.5 cm, left 2 cm nodule [de-identified] : Incision well healed. No dominant or suspicious mass.Left inferior areolar changes in skin most likely due to radiation healing. No suspicious findings. retraction upper inner quadrant stable, no evidence of recurrence

## 2021-01-07 NOTE — ASSESSMENT
[FreeTextEntry1] : MNG and hx of breast cancer  no suspicious findings on PE, no evidence of recurrence,  jessika mammo and br US and thyroid US 6/2021 ,  RTO 6 mo.  I reviewed the expected course of illness and the intent of current treatment with the patient. I have answered her questions.\par

## 2021-01-07 NOTE — HISTORY OF PRESENT ILLNESS
[FreeTextEntry1] : Patient underwent left partial mastectomy with sentinel lymph node excision May 13, 2016. Diagnosed with 9 mm invasive ductal carcinoma ER+, DE+, 6/9 SBR. Margins negative. Patient completed course of radiation. Patient stopped anastrozole due to lump sensation and throat.\par Patient with left br cancer 4 years ago.  denies br mass or d/c.  jessika mammo and US 6/2020 BIRADS 2.  many year hx of MNG with biopsy of left nodule 1/2019 benign.  repeat US 6/2020 stable MNG.  denies dysphagia, hoarseness or SOB. anxious about the world.

## 2021-02-09 ENCOUNTER — APPOINTMENT (OUTPATIENT)
Dept: MRI IMAGING | Facility: CLINIC | Age: 66
End: 2021-02-09
Payer: MEDICARE

## 2021-02-09 ENCOUNTER — OUTPATIENT (OUTPATIENT)
Dept: OUTPATIENT SERVICES | Facility: HOSPITAL | Age: 66
LOS: 1 days | End: 2021-02-09
Payer: MEDICARE

## 2021-02-09 DIAGNOSIS — Z98.89 OTHER SPECIFIED POSTPROCEDURAL STATES: Chronic | ICD-10-CM

## 2021-02-09 DIAGNOSIS — Z00.8 ENCOUNTER FOR OTHER GENERAL EXAMINATION: ICD-10-CM

## 2021-02-09 PROCEDURE — 70551 MRI BRAIN STEM W/O DYE: CPT

## 2021-02-09 PROCEDURE — 70551 MRI BRAIN STEM W/O DYE: CPT | Mod: 26,MH

## 2021-02-09 PROCEDURE — 70544 MR ANGIOGRAPHY HEAD W/O DYE: CPT | Mod: XU

## 2021-06-16 ENCOUNTER — APPOINTMENT (OUTPATIENT)
Dept: ULTRASOUND IMAGING | Facility: CLINIC | Age: 66
End: 2021-06-16
Payer: MEDICARE

## 2021-06-16 ENCOUNTER — OUTPATIENT (OUTPATIENT)
Dept: OUTPATIENT SERVICES | Facility: HOSPITAL | Age: 66
LOS: 1 days | End: 2021-06-16
Payer: MEDICARE

## 2021-06-16 ENCOUNTER — APPOINTMENT (OUTPATIENT)
Dept: RADIOLOGY | Facility: CLINIC | Age: 66
End: 2021-06-16
Payer: MEDICARE

## 2021-06-16 DIAGNOSIS — Z98.89 OTHER SPECIFIED POSTPROCEDURAL STATES: Chronic | ICD-10-CM

## 2021-06-16 DIAGNOSIS — E55.9 VITAMIN D DEFICIENCY, UNSPECIFIED: ICD-10-CM

## 2021-06-16 DIAGNOSIS — C50.912 MALIGNANT NEOPLASM OF UNSPECIFIED SITE OF LEFT FEMALE BREAST: ICD-10-CM

## 2021-06-16 DIAGNOSIS — D05.10 INTRADUCTAL CARCINOMA IN SITU OF UNSPECIFIED BREAST: ICD-10-CM

## 2021-06-16 DIAGNOSIS — R10.9 UNSPECIFIED ABDOMINAL PAIN: ICD-10-CM

## 2021-06-16 DIAGNOSIS — E78.5 HYPERLIPIDEMIA, UNSPECIFIED: ICD-10-CM

## 2021-06-16 PROCEDURE — 74018 RADEX ABDOMEN 1 VIEW: CPT

## 2021-06-16 PROCEDURE — 76856 US EXAM PELVIC COMPLETE: CPT | Mod: 26

## 2021-06-16 PROCEDURE — 76700 US EXAM ABDOM COMPLETE: CPT | Mod: 26

## 2021-06-16 PROCEDURE — 74018 RADEX ABDOMEN 1 VIEW: CPT | Mod: 26

## 2021-06-16 PROCEDURE — 76856 US EXAM PELVIC COMPLETE: CPT

## 2021-06-16 PROCEDURE — 76700 US EXAM ABDOM COMPLETE: CPT

## 2021-06-24 ENCOUNTER — APPOINTMENT (OUTPATIENT)
Dept: MAMMOGRAPHY | Facility: IMAGING CENTER | Age: 66
End: 2021-06-24
Payer: MEDICARE

## 2021-06-24 ENCOUNTER — OUTPATIENT (OUTPATIENT)
Dept: OUTPATIENT SERVICES | Facility: HOSPITAL | Age: 66
LOS: 1 days | End: 2021-06-24
Payer: MEDICARE

## 2021-06-24 ENCOUNTER — RESULT REVIEW (OUTPATIENT)
Age: 66
End: 2021-06-24

## 2021-06-24 ENCOUNTER — APPOINTMENT (OUTPATIENT)
Dept: ULTRASOUND IMAGING | Facility: IMAGING CENTER | Age: 66
End: 2021-06-24
Payer: MEDICARE

## 2021-06-24 DIAGNOSIS — Z98.89 OTHER SPECIFIED POSTPROCEDURAL STATES: Chronic | ICD-10-CM

## 2021-06-24 DIAGNOSIS — Z00.8 ENCOUNTER FOR OTHER GENERAL EXAMINATION: ICD-10-CM

## 2021-06-24 PROCEDURE — 76536 US EXAM OF HEAD AND NECK: CPT | Mod: 26

## 2021-06-24 PROCEDURE — 76536 US EXAM OF HEAD AND NECK: CPT

## 2021-06-24 PROCEDURE — 77063 BREAST TOMOSYNTHESIS BI: CPT

## 2021-06-24 PROCEDURE — 76641 ULTRASOUND BREAST COMPLETE: CPT | Mod: 26,50

## 2021-06-24 PROCEDURE — 77063 BREAST TOMOSYNTHESIS BI: CPT | Mod: 26

## 2021-06-24 PROCEDURE — 76641 ULTRASOUND BREAST COMPLETE: CPT

## 2021-06-24 PROCEDURE — 77067 SCR MAMMO BI INCL CAD: CPT | Mod: 26

## 2021-06-24 PROCEDURE — 77067 SCR MAMMO BI INCL CAD: CPT

## 2021-07-01 ENCOUNTER — APPOINTMENT (OUTPATIENT)
Dept: SURGERY | Facility: CLINIC | Age: 66
End: 2021-07-01
Payer: MEDICARE

## 2021-07-01 PROCEDURE — 99213 OFFICE O/P EST LOW 20 MIN: CPT

## 2021-07-01 NOTE — ASSESSMENT
[FreeTextEntry1] : MNG and hx of breast cancer  no suspicious findings on PE or imaging , no evidence of recurrence,   ,  RTO 6 mo.  I reviewed the expected course of illness and the intent of current treatment with the patient. I have answered her questions.\par

## 2021-07-01 NOTE — PHYSICAL EXAM
[Normocephalic] : normocephalic [EOMI] : extra ocular movement intact [Sclera nonicteric] : sclera nonicteric [Supple] : supple [No Supraclavicular Adenopathy] : no supraclavicular adenopathy [No Cervical Adenopathy] : no cervical adenopathy [No Thyromegaly] : no thyromegaly [Symmetrical] : symmetrical [No dominant masses] : no dominant masses in right breast  [No dominant masses] : no dominant masses left breast [No Nipple Retraction] : no left nipple retraction [No Nipple Discharge] : no left nipple discharge [No Axillary Lymphadenopathy] : no left axillary lymphadenopathy [Soft] : abdomen soft [No Swelling] : no swelling [Full ROM] : full range of motion [No Rashes] : no rashes [de-identified] : right thyroid nodule smooth, nt ~1.5 cm, left 2 cm nodule [de-identified] : Incision well healed. No dominant or suspicious mass.Left inferior areolar changes in skin most likely due to radiation healing. No suspicious findings. retraction upper inner quadrant stable, no evidence of recurrence

## 2021-07-01 NOTE — HISTORY OF PRESENT ILLNESS
[FreeTextEntry1] : Patient underwent left partial mastectomy with sentinel lymph node excision May 13, 2016. Diagnosed with 9 mm invasive ductal carcinoma ER+, AL+, 6/9 SBR. Margins negative. Patient completed course of radiation. Patient stopped anastrozole due to lump sensation and throat.\par Patient with left br cancer 5 years ago.  denies br mass or d/c.  jessika mammo and US 6/2021 BIRADS 2.  many year hx of MNG with biopsy of left nodule 1/2019 benign.  repeat US 6/2021 stable MNG.  denies dysphagia, hoarseness or SOB. anxious about the world.  I have reviewed all old and new data and available images.  Daughter broke off her engagement.

## 2021-12-17 ENCOUNTER — APPOINTMENT (OUTPATIENT)
Dept: SURGERY | Facility: CLINIC | Age: 66
End: 2021-12-17
Payer: MEDICARE

## 2021-12-17 DIAGNOSIS — E04.1 NONTOXIC SINGLE THYROID NODULE: ICD-10-CM

## 2021-12-17 PROCEDURE — 99213 OFFICE O/P EST LOW 20 MIN: CPT

## 2021-12-17 NOTE — HISTORY OF PRESENT ILLNESS
[FreeTextEntry1] : Patient underwent left partial mastectomy with sentinel lymph node excision May 13, 2016. Diagnosed with 9 mm invasive ductal carcinoma ER+, MO+, 6/9 SBR. Margins negative. Patient completed course of radiation. Patient stopped anastrozole due to lump sensation and throat.\par Patient with left br cancer 5 years ago.  denies br mass or d/c.  jessika mammo and US 6/2021 BIRADS 2.  many year hx of MNG with biopsy of left nodule 1/2019 benign.  repeat US 6/2021 stable MNG.  denies dysphagia, hoarseness or SOB. anxious about the world.  I have reviewed all old and new data and available images.

## 2021-12-17 NOTE — PHYSICAL EXAM
[Normocephalic] : normocephalic [EOMI] : extra ocular movement intact [Sclera nonicteric] : sclera nonicteric [Supple] : supple [No Supraclavicular Adenopathy] : no supraclavicular adenopathy [No Cervical Adenopathy] : no cervical adenopathy [No Thyromegaly] : no thyromegaly [Symmetrical] : symmetrical [No dominant masses] : no dominant masses in right breast  [No dominant masses] : no dominant masses left breast [No Nipple Retraction] : no left nipple retraction [No Nipple Discharge] : no left nipple discharge [No Axillary Lymphadenopathy] : no left axillary lymphadenopathy [Soft] : abdomen soft [No Swelling] : no swelling [Full ROM] : full range of motion [No Rashes] : no rashes [de-identified] : right thyroid nodule smooth, nt ~1.5 cm, left 2 cm nodule [de-identified] : Incision well healed. No dominant or suspicious mass.Left inferior areolar changes in skin most likely due to radiation healing. No suspicious findings. retraction upper inner quadrant stable, no evidence of recurrence

## 2021-12-17 NOTE — ASSESSMENT
[FreeTextEntry1] : MNG and hx of breast cancer  no suspicious findings on PE or imaging , no evidence of recurrence, bilateral mammogram and breast ultrasound and thyroid ultrasound June 2022,  RTO 6 mo.  I reviewed the expected course of illness and the intent of current treatment with the patient. I have answered her questions.\par

## 2022-05-13 ENCOUNTER — NON-APPOINTMENT (OUTPATIENT)
Age: 67
End: 2022-05-13

## 2022-06-16 ENCOUNTER — APPOINTMENT (OUTPATIENT)
Dept: SURGERY | Facility: CLINIC | Age: 67
End: 2022-06-16
Payer: MEDICARE

## 2022-06-16 DIAGNOSIS — D05.10 INTRADUCTAL CARCINOMA IN SITU OF UNSPECIFIED BREAST: ICD-10-CM

## 2022-06-16 DIAGNOSIS — R92.8 OTHER ABNORMAL AND INCONCLUSIVE FINDINGS ON DIAGNOSTIC IMAGING OF BREAST: ICD-10-CM

## 2022-06-16 PROCEDURE — 99214 OFFICE O/P EST MOD 30 MIN: CPT

## 2022-06-16 RX ORDER — BENZONATATE 100 MG/1
100 CAPSULE ORAL
Qty: 30 | Refills: 0 | Status: DISCONTINUED | COMMUNITY
Start: 2022-05-14

## 2022-06-16 RX ORDER — COVID-19 MOLECULAR TEST ASSAY
KIT MISCELLANEOUS
Qty: 8 | Refills: 0 | Status: DISCONTINUED | COMMUNITY
Start: 2022-03-25

## 2022-06-16 RX ORDER — AMOXICILLIN 500 MG/1
500 TABLET, FILM COATED ORAL
Qty: 21 | Refills: 0 | Status: DISCONTINUED | COMMUNITY
Start: 2022-02-15

## 2022-06-16 RX ORDER — IBUPROFEN 600 MG/1
600 TABLET, FILM COATED ORAL
Qty: 20 | Refills: 0 | Status: DISCONTINUED | COMMUNITY
Start: 2022-02-15

## 2022-06-16 NOTE — HISTORY OF PRESENT ILLNESS
sirolimus (RAPAMUNE) 1 MG tablet Take 2 tablets by mouth daily (at noon).   Patient is requesting this medication and I am unable to find it on his medication list. Patient use's [facility] but the pharmacy states it will not go through there. Please call patient @ [phone] can leave a message.                   [FreeTextEntry1] : Patient underwent left partial mastectomy with sentinel lymph node excision May 13, 2016. Diagnosed with 9 mm invasive ductal carcinoma ER+, DE+, 6/9 SBR. Margins negative. Patient completed course of radiation. Patient stopped anastrozole due to lump sensation and throat.\par Patient with left br cancer 5 years ago.  denies br mass or d/c.  jessika mammo and US 6/2021 BIRADS 2.  many year hx of MNG with biopsy of left nodule 1/2019 benign.  repeat US 6/2021 stable MNG.  denies dysphagia, hoarseness or SOB.  Patient is scheduled for mammogram and thyroid ultrasound next week.  I have reviewed all old and new data and available images.

## 2022-06-16 NOTE — ASSESSMENT
[FreeTextEntry1] : MNG and hx of breast cancer  no suspicious findings on PE or prior  imaging , no evidence of recurrence, bilateral mammogram and breast ultrasound and thyroid ultrasound now ,  RTO 6 mo.  I reviewed the expected course of illness and the intent of current treatment with the patient. I have answered her questions.\par

## 2022-06-16 NOTE — PHYSICAL EXAM
[Normocephalic] : normocephalic [EOMI] : extra ocular movement intact [Sclera nonicteric] : sclera nonicteric [Supple] : supple [No Supraclavicular Adenopathy] : no supraclavicular adenopathy [No Cervical Adenopathy] : no cervical adenopathy [No Thyromegaly] : no thyromegaly [Symmetrical] : symmetrical [No dominant masses] : no dominant masses in right breast  [No dominant masses] : no dominant masses left breast [No Nipple Retraction] : no left nipple retraction [No Nipple Discharge] : no left nipple discharge [No Axillary Lymphadenopathy] : no left axillary lymphadenopathy [Soft] : abdomen soft [No Swelling] : no swelling [Full ROM] : full range of motion [No Rashes] : no rashes [de-identified] : right thyroid nodule smooth, nt ~1.5 cm, left 2 cm nodule [de-identified] : Incision well healed. No dominant or suspicious mass.Left inferior areolar changes in skin most likely due to radiation healing. No suspicious findings. retraction upper inner quadrant stable, no evidence of recurrence

## 2022-06-27 ENCOUNTER — OUTPATIENT (OUTPATIENT)
Dept: OUTPATIENT SERVICES | Facility: HOSPITAL | Age: 67
LOS: 1 days | End: 2022-06-27
Payer: MEDICARE

## 2022-06-27 ENCOUNTER — APPOINTMENT (OUTPATIENT)
Dept: ULTRASOUND IMAGING | Facility: IMAGING CENTER | Age: 67
End: 2022-06-27
Payer: MEDICARE

## 2022-06-27 ENCOUNTER — RESULT REVIEW (OUTPATIENT)
Age: 67
End: 2022-06-27

## 2022-06-27 ENCOUNTER — APPOINTMENT (OUTPATIENT)
Dept: MAMMOGRAPHY | Facility: IMAGING CENTER | Age: 67
End: 2022-06-27
Payer: MEDICARE

## 2022-06-27 DIAGNOSIS — Z98.89 OTHER SPECIFIED POSTPROCEDURAL STATES: Chronic | ICD-10-CM

## 2022-06-27 DIAGNOSIS — C50.912 MALIGNANT NEOPLASM OF UNSPECIFIED SITE OF LEFT FEMALE BREAST: ICD-10-CM

## 2022-06-27 PROCEDURE — 77067 SCR MAMMO BI INCL CAD: CPT

## 2022-06-27 PROCEDURE — 77067 SCR MAMMO BI INCL CAD: CPT | Mod: 26

## 2022-06-27 PROCEDURE — 76536 US EXAM OF HEAD AND NECK: CPT | Mod: 26

## 2022-06-27 PROCEDURE — 77063 BREAST TOMOSYNTHESIS BI: CPT | Mod: 26

## 2022-06-27 PROCEDURE — 76536 US EXAM OF HEAD AND NECK: CPT

## 2022-06-27 PROCEDURE — 76641 ULTRASOUND BREAST COMPLETE: CPT | Mod: 26,50

## 2022-06-27 PROCEDURE — 77063 BREAST TOMOSYNTHESIS BI: CPT

## 2022-06-27 PROCEDURE — 76641 ULTRASOUND BREAST COMPLETE: CPT

## 2022-08-07 ENCOUNTER — NON-APPOINTMENT (OUTPATIENT)
Age: 67
End: 2022-08-07

## 2022-08-08 ENCOUNTER — EMERGENCY (EMERGENCY)
Facility: HOSPITAL | Age: 67
LOS: 1 days | Discharge: HOME | End: 2022-08-08
Attending: STUDENT IN AN ORGANIZED HEALTH CARE EDUCATION/TRAINING PROGRAM | Admitting: EMERGENCY MEDICINE

## 2022-08-08 VITALS
HEIGHT: 59 IN | OXYGEN SATURATION: 99 % | HEART RATE: 79 BPM | RESPIRATION RATE: 18 BRPM | SYSTOLIC BLOOD PRESSURE: 181 MMHG | DIASTOLIC BLOOD PRESSURE: 105 MMHG | TEMPERATURE: 98 F

## 2022-08-08 VITALS
DIASTOLIC BLOOD PRESSURE: 83 MMHG | OXYGEN SATURATION: 100 % | SYSTOLIC BLOOD PRESSURE: 135 MMHG | TEMPERATURE: 98 F | HEART RATE: 62 BPM

## 2022-08-08 DIAGNOSIS — Z98.89 OTHER SPECIFIED POSTPROCEDURAL STATES: Chronic | ICD-10-CM

## 2022-08-08 LAB
ALBUMIN SERPL ELPH-MCNC: 4.1 G/DL — SIGNIFICANT CHANGE UP (ref 3.3–5)
ALP SERPL-CCNC: 66 U/L — SIGNIFICANT CHANGE UP (ref 40–120)
ALT FLD-CCNC: 15 U/L — SIGNIFICANT CHANGE UP (ref 4–33)
ANION GAP SERPL CALC-SCNC: 15 MMOL/L — HIGH (ref 7–14)
APTT BLD: 30.1 SEC — SIGNIFICANT CHANGE UP (ref 27–36.3)
AST SERPL-CCNC: 16 U/L — SIGNIFICANT CHANGE UP (ref 4–32)
BASOPHILS # BLD AUTO: 0.02 K/UL — SIGNIFICANT CHANGE UP (ref 0–0.2)
BASOPHILS NFR BLD AUTO: 0.2 % — SIGNIFICANT CHANGE UP (ref 0–2)
BILIRUB SERPL-MCNC: 0.3 MG/DL — SIGNIFICANT CHANGE UP (ref 0.2–1.2)
BLD GP AB SCN SERPL QL: NEGATIVE — SIGNIFICANT CHANGE UP
BUN SERPL-MCNC: 16 MG/DL — SIGNIFICANT CHANGE UP (ref 7–23)
CALCIUM SERPL-MCNC: 9.6 MG/DL — SIGNIFICANT CHANGE UP (ref 8.4–10.5)
CHLORIDE SERPL-SCNC: 102 MMOL/L — SIGNIFICANT CHANGE UP (ref 98–107)
CO2 SERPL-SCNC: 22 MMOL/L — SIGNIFICANT CHANGE UP (ref 22–31)
CREAT SERPL-MCNC: 0.7 MG/DL — SIGNIFICANT CHANGE UP (ref 0.5–1.3)
EGFR: 95 ML/MIN/1.73M2 — SIGNIFICANT CHANGE UP
EOSINOPHIL # BLD AUTO: 0.02 K/UL — SIGNIFICANT CHANGE UP (ref 0–0.5)
EOSINOPHIL NFR BLD AUTO: 0.2 % — SIGNIFICANT CHANGE UP (ref 0–6)
GLUCOSE SERPL-MCNC: 103 MG/DL — HIGH (ref 70–99)
HCT VFR BLD CALC: 42.1 % — SIGNIFICANT CHANGE UP (ref 34.5–45)
HGB BLD-MCNC: 13.4 G/DL — SIGNIFICANT CHANGE UP (ref 11.5–15.5)
IANC: 8.62 K/UL — HIGH (ref 1.8–7.4)
IMM GRANULOCYTES NFR BLD AUTO: 0.3 % — SIGNIFICANT CHANGE UP (ref 0–1.5)
INR BLD: 1.01 RATIO — SIGNIFICANT CHANGE UP (ref 0.88–1.16)
LIDOCAIN IGE QN: 29 U/L — SIGNIFICANT CHANGE UP (ref 7–60)
LYMPHOCYTES # BLD AUTO: 1.23 K/UL — SIGNIFICANT CHANGE UP (ref 1–3.3)
LYMPHOCYTES # BLD AUTO: 11.8 % — LOW (ref 13–44)
MCHC RBC-ENTMCNC: 27.8 PG — SIGNIFICANT CHANGE UP (ref 27–34)
MCHC RBC-ENTMCNC: 31.8 GM/DL — LOW (ref 32–36)
MCV RBC AUTO: 87.3 FL — SIGNIFICANT CHANGE UP (ref 80–100)
MONOCYTES # BLD AUTO: 0.51 K/UL — SIGNIFICANT CHANGE UP (ref 0–0.9)
MONOCYTES NFR BLD AUTO: 4.9 % — SIGNIFICANT CHANGE UP (ref 2–14)
NEUTROPHILS # BLD AUTO: 8.62 K/UL — HIGH (ref 1.8–7.4)
NEUTROPHILS NFR BLD AUTO: 82.6 % — HIGH (ref 43–77)
NRBC # BLD: 0 /100 WBCS — SIGNIFICANT CHANGE UP
NRBC # FLD: 0 K/UL — SIGNIFICANT CHANGE UP
PLATELET # BLD AUTO: 289 K/UL — SIGNIFICANT CHANGE UP (ref 150–400)
POTASSIUM SERPL-MCNC: 3.8 MMOL/L — SIGNIFICANT CHANGE UP (ref 3.5–5.3)
POTASSIUM SERPL-SCNC: 3.8 MMOL/L — SIGNIFICANT CHANGE UP (ref 3.5–5.3)
PROT SERPL-MCNC: 6.8 G/DL — SIGNIFICANT CHANGE UP (ref 6–8.3)
PROTHROM AB SERPL-ACNC: 11.7 SEC — SIGNIFICANT CHANGE UP (ref 10.5–13.4)
RBC # BLD: 4.82 M/UL — SIGNIFICANT CHANGE UP (ref 3.8–5.2)
RBC # FLD: 12.6 % — SIGNIFICANT CHANGE UP (ref 10.3–14.5)
RH IG SCN BLD-IMP: POSITIVE — SIGNIFICANT CHANGE UP
SODIUM SERPL-SCNC: 139 MMOL/L — SIGNIFICANT CHANGE UP (ref 135–145)
WBC # BLD: 10.43 K/UL — SIGNIFICANT CHANGE UP (ref 3.8–10.5)
WBC # FLD AUTO: 10.43 K/UL — SIGNIFICANT CHANGE UP (ref 3.8–10.5)

## 2022-08-08 PROCEDURE — 99284 EMERGENCY DEPT VISIT MOD MDM: CPT

## 2022-08-08 PROCEDURE — 74177 CT ABD & PELVIS W/CONTRAST: CPT | Mod: 26,MA

## 2022-08-08 PROCEDURE — 71045 X-RAY EXAM CHEST 1 VIEW: CPT | Mod: 26

## 2022-08-08 RX ORDER — ACETAMINOPHEN 500 MG
650 TABLET ORAL ONCE
Refills: 0 | Status: COMPLETED | OUTPATIENT
Start: 2022-08-08 | End: 2022-08-08

## 2022-08-08 RX ORDER — SODIUM CHLORIDE 9 MG/ML
1000 INJECTION INTRAMUSCULAR; INTRAVENOUS; SUBCUTANEOUS ONCE
Refills: 0 | Status: COMPLETED | OUTPATIENT
Start: 2022-08-08 | End: 2022-08-08

## 2022-08-08 RX ORDER — ALPRAZOLAM 0.25 MG
0.25 TABLET ORAL ONCE
Refills: 0 | Status: DISCONTINUED | OUTPATIENT
Start: 2022-08-08 | End: 2022-08-08

## 2022-08-08 RX ADMIN — Medication 650 MILLIGRAM(S): at 15:15

## 2022-08-08 RX ADMIN — Medication 0.25 MILLIGRAM(S): at 15:15

## 2022-08-08 RX ADMIN — SODIUM CHLORIDE 1000 MILLILITER(S): 9 INJECTION INTRAMUSCULAR; INTRAVENOUS; SUBCUTANEOUS at 15:15

## 2022-08-08 NOTE — ED PROVIDER NOTE - OBJECTIVE STATEMENT
Jarrell PGY1 - 65 yo F with h/o breast cx,  x2 p/w b/l dull, lower abdominal pain.  Patient presented to urgent care this morning with sharp, stabbing RLQ pain and was sent to the ED for concerns of appendicitis vs constipation.  Now at ED patient reports her pain has significantly subsided but has bilateral dull lower quadrant pain.  She reports that she has had decreased stooling over the past few days and that her pain has improved with the use of a suppository. Has not eaten any raw seafood, no sick contacts, no recent travel. Reports having chills and nausea associated with her pain, otherwise no headache, vision changes, CP, SOB, vomiting, diarrhea, dysuria, rashes, peripheral edema, blood in urine, or blood in stool.

## 2022-08-08 NOTE — ED PROVIDER NOTE - CLINICAL SUMMARY MEDICAL DECISION MAKING FREE TEXT BOX
Jarrell PGY1 - 67 yo F with h/o breast cx,  x2 p/w b/l dull, lower abdominal pain.  Concerning for diverticulitis vs appendicitis vs constipation.  Labs, CT a/p, IVF ordered.

## 2022-08-08 NOTE — ED PROVIDER NOTE - PHYSICAL EXAMINATION
GENERAL: well appearing in no acute distress, non-toxic appearing  HEAD: normocephalic, atraumatic  HEENT: normal conjunctiva, oral mucosa moist  CARDIAC: regular rate and rhythm, normal S1S2, no appreciable murmurs, 2+ pulses in LE b/l  PULM: normal breath sounds, clear to ascultation bilaterally, no rales, rhonchi, wheezing  GI: abdomen nondistended, soft, b/l lower quadrant tenderness, no guarding  : no CVA tenderness b/l, mild suprapubic tenderness  NEURO: no focal motor or sensory deficits, CN2-12 intact, normal speech, AAOx3  MSK: no peripheral edema, no calf tenderness b/l  SKIN: well-perfused, extremities warm, no visible rashes  PSYCH: appropriate mood and affect

## 2022-08-08 NOTE — ED ADULT NURSE NOTE - NSFALLRSKINDICATORS_ED_ALL_ED
04/26/22 11:11 AM     Thank you for your request  Your request has been received, reviewed, and the patient chart updated  The PCP has successfully been removed with a patient attribution note  This message will now be completed      Thank you  Kahlil Monday
Please remove Dr Nathalia Allen as PCP  Patient has established care with [address] [facility] 
no

## 2022-08-08 NOTE — ED ADULT NURSE REASSESSMENT NOTE - NS ED NURSE REASSESS COMMENT FT1
Break coverage RN: Pt A&Ox4, restring on stretcher. Respirations even and unlabored, sating 100% on RA. Pt well appearing, offers no complaints at this time. Pending CT results. bed in lowest position, side rails up, safety maintained.

## 2022-08-08 NOTE — ED ADULT TRIAGE NOTE - CHIEF COMPLAINT QUOTE
Pt c/o RLQ pain the past week that became worse last night with nausea and sent by UC to r/o appendicitis. Pt appears uncomfortable, PMH anxiety

## 2022-08-08 NOTE — ED PROVIDER NOTE - PROGRESS NOTE DETAILS
Rona Retana PGY-3 patient signed out to me, pending ct. low suspicion for appy. will reassess abd pending scan read Rona Retana PGY-3 patien ttolerating PO, ct negative. stable for discharge.   patient well appearing, stable for discharge, vitals reviewed, given strict return precautions for worsening abd pain Plan to follow up with pmd

## 2022-08-08 NOTE — ED PROVIDER NOTE - ATTENDING CONTRIBUTION TO CARE
I (Nona) agree with above, I performed a history and physical. Counseled jimenez medical staff, physician assistant, and/or medical student on medical decision making as documented. Medical decisions and treatment interventions were made in real time during the patient encounter. Additionally and/or with the following exceptions: The patient presented to the ED with right lower quadrant pain as above, went to urgent care who referred to ed for appendiciits rule out. the patient had no voming, diarrhea, fever or other symptoms, had minimal tenderness to palpation in the right lower quadrant, complete blood count within normal limits, complete metabolic panel within normal limits. The patient was signed out to the oncoming attending pending the following: ct imaging of a/p to rule out appendicitis or other surgical pathology with appropriate disposition.

## 2022-08-08 NOTE — ED ADULT NURSE NOTE - OBJECTIVE STATEMENT
pt is A&Ox3, ambulatory, able to make needs known and presents with C/O lower ab pain x 3 dyas with vomiting this morning. Pt reports intensity of pain to right lower ab. At time of assessment PT reports pain to lower AB upon palpation that id described as "tenderness" that is not associated with any rebound tenderness. PT denies any nausea and ab is non-distended. Awaiting orders

## 2022-08-08 NOTE — ED PROVIDER NOTE - NSFOLLOWUPINSTRUCTIONS_ED_ALL_ED_FT
To control your pain at home, you should take Ibuprofen 400 mg along with Tylenol 650mg-1000mg every 6 to 8 hours. Limit your maximum daily Tylenol from all sources to 4000mg. Be aware that many other medications contain acetaminophen which is also known as Tylenol. Taking Tylenol and Ibuprofen together has been shown to be more effective at relieving pain than taking them separately. These are both over the counter medications that you can  at your local pharmacy without a prescription. You need to respect all of the warnings on the bottles. You shouldn’t take these medications for more than a week without following up with your doctor. Both medications come with certain risks and side effects that you need to discuss with your doctor, especially if you are taking them for a prolonged period.     Abdominal Pain, Adult      Pain in the abdomen (abdominal pain) can be caused by many things. Often, abdominal pain is not serious and it gets better with no treatment or by being treated at home. However, sometimes abdominal pain is serious.    Your health care provider will ask questions about your medical history and do a physical exam to try to determine the cause of your abdominal pain.      Follow these instructions at home:    Medicines     •Take over-the-counter and prescription medicines only as told by your health care provider.      • Do not take a laxative unless told by your health care provider.        General instructions      •Watch your condition for any changes.      •Drink enough fluid to keep your urine pale yellow.      •Keep all follow-up visits as told by your health care provider. This is important.        Contact a health care provider if:    •Your abdominal pain changes or gets worse.      •You are not hungry or you lose weight without trying.      •You are constipated or have diarrhea for more than 2–3 days.      •You have pain when you urinate or have a bowel movement.      •Your abdominal pain wakes you up at night.      •Your pain gets worse with meals, after eating, or with certain foods.      •You are vomiting and cannot keep anything down.      •You have a fever.      •You have blood in your urine.        Get help right away if:    •Your pain does not go away as soon as your health care provider told you to expect.      •You cannot stop vomiting.      •Your pain is only in areas of the abdomen, such as the right side or the left lower portion of the abdomen. Pain on the right side could be caused by appendicitis.      •You have bloody or black stools, or stools that look like tar.      •You have severe pain, cramping, or bloating in your abdomen.    •You have signs of dehydration, such as:  •Dark urine, very little urine, or no urine.      •Cracked lips.      •Dry mouth.      •Sunken eyes.      •Sleepiness.      •Weakness.        •You have trouble breathing or chest pain.        Summary    •Often, abdominal pain is not serious and it gets better with no treatment or by being treated at home. However, sometimes abdominal pain is serious.      •Watch your condition for any changes.      •Take over-the-counter and prescription medicines only as told by your health care provider.      •Contact a health care provider if your abdominal pain changes or gets worse.      •Get help right away if you have severe pain, cramping, or bloating in your abdomen.      This information is not intended to replace advice given to you by your health care provider. Make sure you discuss any questions you have with your health care provider.

## 2022-08-08 NOTE — ED PROVIDER NOTE - PATIENT PORTAL LINK FT
You can access the FollowMyHealth Patient Portal offered by [facility] by registering at the following website: [email or web address]. By joining [employer]’s FollowMyHealth portal, you will also be able to view your health information using other applications (apps) compatible with our system.

## 2022-12-08 ENCOUNTER — APPOINTMENT (OUTPATIENT)
Dept: SURGERY | Facility: CLINIC | Age: 67
End: 2022-12-08

## 2022-12-08 PROCEDURE — 99214 OFFICE O/P EST MOD 30 MIN: CPT

## 2022-12-08 NOTE — PHYSICAL EXAM
[Normocephalic] : normocephalic [EOMI] : extra ocular movement intact [Sclera nonicteric] : sclera nonicteric [Supple] : supple [No Supraclavicular Adenopathy] : no supraclavicular adenopathy [No Cervical Adenopathy] : no cervical adenopathy [No Thyromegaly] : no thyromegaly [Symmetrical] : symmetrical [No dominant masses] : no dominant masses in right breast  [No dominant masses] : no dominant masses left breast [No Nipple Retraction] : no left nipple retraction [No Nipple Discharge] : no left nipple discharge [No Axillary Lymphadenopathy] : no left axillary lymphadenopathy [Soft] : abdomen soft [No Swelling] : no swelling [Full ROM] : full range of motion [No Rashes] : no rashes [de-identified] : right thyroid nodule smooth, nt ~1.5 cm, left 2 cm nodule [de-identified] : Incision well healed. No dominant or suspicious mass.Left inferior areolar changes in skin most likely due to radiation healing. No suspicious findings. retraction upper inner quadrant stable, no evidence of recurrence

## 2022-12-08 NOTE — ASSESSMENT
[FreeTextEntry1] : MNG and hx of breast cancer  no suspicious findings on PE or prior  imaging , no evidence of recurrence, bilateral mammogram and breast ultrasound and thyroid ultrasound 6/2023  if stable RTO 1 year.    I reviewed the expected course of illness and the intent of current treatment with the patient. I have answered her questions.\par

## 2022-12-08 NOTE — HISTORY OF PRESENT ILLNESS
[FreeTextEntry1] : Patient underwent left partial mastectomy with sentinel lymph node excision May 13, 2016. Diagnosed with 9 mm invasive ductal carcinoma ER+, WA+, 6/9 SBR. Margins negative. Patient completed course of radiation. Patient stopped anastrozole due to lump sensation and throat.\par Patient with left br cancer 6 years ago.  denies br mass or d/c.  jessika mammo and US 6/2022 BIRADS 2.  many year hx of MNG with biopsy of left nodule 1/2019 benign.  repeat US 6/2022 stable MNG.  denies dysphagia, hoarseness or SOB.  I have reviewed all old and new data and available images.

## 2022-12-21 ENCOUNTER — OFFICE (OUTPATIENT)
Dept: URBAN - METROPOLITAN AREA CLINIC 90 | Facility: CLINIC | Age: 67
Setting detail: OPHTHALMOLOGY
End: 2022-12-21
Payer: MEDICARE

## 2022-12-21 DIAGNOSIS — H01.005: ICD-10-CM

## 2022-12-21 DIAGNOSIS — H04.89: ICD-10-CM

## 2022-12-21 DIAGNOSIS — Z96.1: ICD-10-CM

## 2022-12-21 DIAGNOSIS — H01.004: ICD-10-CM

## 2022-12-21 DIAGNOSIS — H43.393: ICD-10-CM

## 2022-12-21 DIAGNOSIS — H01.002: ICD-10-CM

## 2022-12-21 DIAGNOSIS — H43.813: ICD-10-CM

## 2022-12-21 DIAGNOSIS — H01.001: ICD-10-CM

## 2022-12-21 PROCEDURE — 92014 COMPRE OPH EXAM EST PT 1/>: CPT | Performed by: OPHTHALMOLOGY

## 2022-12-21 ASSESSMENT — REFRACTION_MANIFEST
OD_CYLINDER: +0.50
OD_ADD: +2.50
OS_AXIS: 150
OD_AXIS: 020
OD_CYLINDER: +0.50
OD_VA2: 20/20
OD_AXIS: 030
OD_VA1: 20/25
OS_VA1: 20/20
OD_SPHERE: -1.50
OS_ADD: +2.50
OD_VA1: 20/30+2
OS_VA2: 20/20
OS_SPHERE: -1.00
OS_VA2: 20/20
OD_AXIS: 180
OS_ADD: +2.50
OS_AXIS: 180
OD_SPHERE: -2.00
OD_SPHERE: -0.75
OD_VA1: 20/20
OD_VA1: 20/200-
OS_VA1: 20/20
OD_CYLINDER: +0.75
OD_SPHERE: -1.75
OS_CYLINDER: +0.25
OD_SPHERE: -1.50
OD_VA2: 20/20
OS_AXIS: 110
OS_CYLINDER: +0.25
OS_SPHERE: -0.75
OD_ADD: +2.50
OS_SPHERE: -1.00
OS_VA1: 20/20
OD_VA2: 20/20
OD_CYLINDER: SPH
OS_CYLINDER: +0.25
OD_AXIS: 15
OS_SPHERE: -0.75
OS_VA1: 20/20
OS_VA2: 20/20
OD_ADD: +2.50
OS_CYLINDER: SPHERE
OS_AXIS: 110
OD_CYLINDER: +0.50
OS_ADD: +2.50
OS_CYLINDER: +0.25
OS_SPHERE: -0.75

## 2022-12-21 ASSESSMENT — KERATOMETRY
OD_AXISANGLE_DEGREES: 103
OD_K2POWER_DIOPTERS: 44.00
OS_K2POWER_DIOPTERS: 46.50
OD_K1POWER_DIOPTERS: 43.50
OS_AXISANGLE_DEGREES: 102
OS_K1POWER_DIOPTERS: 46.25
METHOD_AUTO_MANUAL: AUTO

## 2022-12-21 ASSESSMENT — LID EXAM ASSESSMENTS
OD_BLEPHARITIS: RLL RUL 1+ 2+
OS_BLEPHARITIS: LLL LUL 1+ 2+

## 2022-12-21 ASSESSMENT — AXIALLENGTH_DERIVED
OS_AL: 22.8978
OS_AL: 22.8064
OD_AL: 24.095
OS_AL: 22.8064
OD_AL: 23.9938
OD_AL: 23.6953
OS_AL: 22.8064
OD_AL: 23.9435
OD_AL: 24.197

## 2022-12-21 ASSESSMENT — SPHEQUIV_DERIVED
OD_SPHEQUIV: -1.5
OS_SPHEQUIV: -0.875
OD_SPHEQUIV: -1.125
OS_SPHEQUIV: -0.625
OS_SPHEQUIV: -0.625
OD_SPHEQUIV: -0.5
OS_SPHEQUIV: -0.625
OD_SPHEQUIV: -1.75
OD_SPHEQUIV: -1.25

## 2022-12-21 ASSESSMENT — REFRACTION_CURRENTRX
OS_SPHERE: -0.75
OD_SPHERE: -1.75
OD_VPRISM_DIRECTION: SV
OS_OVR_VA: 20/
OS_CYLINDER: -0.25
OS_SPHERE: +2.00
OD_SPHERE: +1.25
OD_CYLINDER: -0.50
OD_AXIS: 099
OD_AXIS: 017
OS_VPRISM_DIRECTION: SV
OD_OVR_VA: 20/
OS_CYLINDER: +0.25
OS_AXIS: 005
OD_CYLINDER: +0.50
OS_OVR_VA: 20/
OD_VPRISM_DIRECTION: SV
OS_VPRISM_DIRECTION: SV
OD_OVR_VA: 20/
OS_AXIS: 105

## 2022-12-21 ASSESSMENT — VISUAL ACUITY
OD_BCVA: 20/30
OS_BCVA: 20/30

## 2022-12-21 ASSESSMENT — CONFRONTATIONAL VISUAL FIELD TEST (CVF)
OS_FINDINGS: FULL
OD_FINDINGS: FULL

## 2022-12-21 ASSESSMENT — REFRACTION_AUTOREFRACTION
OS_SPHERE: -0.50
OD_SPHERE: -0.25
OD_AXIS: 084
OS_AXIS: 065
OD_CYLINDER: -0.50
OS_CYLINDER: --0.50

## 2022-12-21 ASSESSMENT — TONOMETRY
OD_IOP_MMHG: 18
OS_IOP_MMHG: 18

## 2022-12-21 ASSESSMENT — CORNEAL EDEMA CLINICAL DESCRIPTION: OD_CORNEALEDEMA: ABSENT

## 2022-12-22 PROBLEM — H01.002 BLEPHARITIS;  , RIGHT LOWER LID, RIGHT UPPER LID , LEFT UPPER LID, LEFT LOWER LID: Status: ACTIVE | Noted: 2022-12-21

## 2022-12-22 PROBLEM — H01.001 BLEPHARITIS;  , RIGHT LOWER LID, RIGHT UPPER LID , LEFT UPPER LID, LEFT LOWER LID: Status: ACTIVE | Noted: 2022-12-21

## 2022-12-22 PROBLEM — H01.005 BLEPHARITIS;  , RIGHT LOWER LID, RIGHT UPPER LID , LEFT UPPER LID, LEFT LOWER LID: Status: ACTIVE | Noted: 2022-12-21

## 2022-12-22 PROBLEM — H01.004 BLEPHARITIS;  , RIGHT LOWER LID, RIGHT UPPER LID , LEFT UPPER LID, LEFT LOWER LID: Status: ACTIVE | Noted: 2022-12-21

## 2023-01-25 ENCOUNTER — OUTPATIENT (OUTPATIENT)
Dept: OUTPATIENT SERVICES | Facility: HOSPITAL | Age: 68
LOS: 1 days | Discharge: HOME | End: 2023-01-25

## 2023-01-25 ENCOUNTER — NON-APPOINTMENT (OUTPATIENT)
Age: 68
End: 2023-01-25

## 2023-01-25 ENCOUNTER — APPOINTMENT (OUTPATIENT)
Dept: HEMATOLOGY ONCOLOGY | Facility: CLINIC | Age: 68
End: 2023-01-25
Payer: MEDICARE

## 2023-01-25 VITALS
HEART RATE: 71 BPM | OXYGEN SATURATION: 99 % | WEIGHT: 134.04 LBS | DIASTOLIC BLOOD PRESSURE: 92 MMHG | RESPIRATION RATE: 16 BRPM | BODY MASS INDEX: 27.02 KG/M2 | TEMPERATURE: 97.2 F | HEIGHT: 58.9 IN | SYSTOLIC BLOOD PRESSURE: 170 MMHG

## 2023-01-25 DIAGNOSIS — Z98.89 OTHER SPECIFIED POSTPROCEDURAL STATES: Chronic | ICD-10-CM

## 2023-01-25 DIAGNOSIS — N64.52 NIPPLE DISCHARGE: ICD-10-CM

## 2023-01-25 DIAGNOSIS — C50.912 MALIGNANT NEOPLASM OF UNSPECIFIED SITE OF LEFT FEMALE BREAST: ICD-10-CM

## 2023-01-25 PROCEDURE — 99214 OFFICE O/P EST MOD 30 MIN: CPT

## 2023-01-25 NOTE — HISTORY OF PRESENT ILLNESS
[Disease: _____________________] : Disease: [unfilled] [T: ___] : T[unfilled] [N: ___] : N[unfilled] [M: ___] : M[unfilled] [AJCC Stage: ____] : AJCC Stage: [unfilled] [de-identified] : Left breast cancer age 60\par s/p Left partial mastectomy & SLN 5/13/16 \par RT with Dr Hailee Barger \par She underwent trial of anastrozole which was tolerated every other day but developed tongue swelling and throat discomfort when taken every day. She had this tongue swelling and throat issue before and told this was due to menopausal symptom. She then tried exemestane which caused intolerable joint discomfort. \par \par She had diagnostic mammogram on 4/2016 of left breast that revealed a 5 mm cluster of fine calcifications in the superior left upper breast 12 :00 axis and additional 5 mm cluster of calcifications. Stereotactic core needle biopsy of calcifications at 12:00 axis revealed fibroadenoma.  The left upper inner calcification biopsy revealed DCIS, solid pattern with high grade nuclear atypia and central necrosis, measuring 2 mm. She had MRI of the breast on 5/3/16 which showed far posterior left upper inner nodule along with biopsy proven DCIS in the posterior left upper inner breast. She had u/s guided biopsy of the lesion on 5/4/16 which showed left breast 11:00 invasive moderately differentiated ductal carcinoma that measured at least 0.7cm. On 5/2016, she underwent left lumpectomy with sentinel lymph node biopsy which showed invasive ductal carcinoma measuring 0.9 cm and DCIS measuring 0.2cm with negative sentinel lymph nodes. She started on anastrozole after completion of radiation on 8/2016. She had brief trial of anastrozole but had adverse effect and stopped medication within months.  [de-identified] : 0.9 cm invasive ductal carcinoma ER > 90% PA>90% HER2 Negative SBR 6/9 0/1 SLN Negative  SBR 6/9 , (DCIS  0.2cm )  [de-identified] : Endocrine therapy: 9/2016 to present: tried tamoxifen, exemestane, and finally on anastrozole  [de-identified] : Since last evaluation in 2020, she has been followed by Dr Ding and had breast imaging done 6/27/22 with benign findings. She presents today with c/o discharge from left breast.  She states yesterday she noticed a pimple-like nodule on lower breast and clear d/c came out.  She states she no longer feels pimple, however is still noticing drainage.  She denies any erythema, pain, increased warmth, nipple discharge, or palpable masses.  She denies any other complaints.

## 2023-01-25 NOTE — ASSESSMENT
[FreeTextEntry1] : She is a  68 y/o F with Stage I left breast cancer s/p lumpectomy and sentinel lymph node biopsy. She had tried endocrine therapy but did not tolerate. She is up to date with breast imaging. Now with new d/c from left breast.  Exam negative except for slight d/c from skin.  The patient is advised to have ultrasound of left breast at this time for further evaluation.  The patient agreed and order placed.  All questions and concerns were addressed and answered.

## 2023-01-25 NOTE — PHYSICAL EXAM
[Normal] : affect appropriate [de-identified] : No skin or nipple changes either breast.  Right breast with no discrete palpable masses, no palpable axillary nodes.  Left breast dense, palpable scar tissue, no discrete palpable masses.  In area of patient concern between 5-6:00, there is a pinpoint area, ?pore, with clear fluid draining, difficult to see, appears subcutaneous, no erythema, increase warmth or palpable masses in area.  [de-identified] : anxious

## 2023-01-30 ENCOUNTER — OFFICE (OUTPATIENT)
Dept: URBAN - METROPOLITAN AREA CLINIC 93 | Facility: CLINIC | Age: 68
Setting detail: OPHTHALMOLOGY
End: 2023-01-30
Payer: MEDICARE

## 2023-01-30 ENCOUNTER — RX ONLY (RX ONLY)
Age: 68
End: 2023-01-30

## 2023-01-30 DIAGNOSIS — H04.89: ICD-10-CM

## 2023-01-30 DIAGNOSIS — H05.811: ICD-10-CM

## 2023-01-30 DIAGNOSIS — H04.223: ICD-10-CM

## 2023-01-30 PROBLEM — H01.005 BLEPHARITIS;  , RIGHT LOWER LID, RIGHT UPPER LID , LEFT UPPER LID, LEFT LOWER LID: Status: ACTIVE | Noted: 2023-01-30

## 2023-01-30 PROBLEM — H01.002 BLEPHARITIS;  , RIGHT LOWER LID, RIGHT UPPER LID , LEFT UPPER LID, LEFT LOWER LID: Status: ACTIVE | Noted: 2023-01-30

## 2023-01-30 PROBLEM — H01.004 BLEPHARITIS;  , RIGHT LOWER LID, RIGHT UPPER LID , LEFT UPPER LID, LEFT LOWER LID: Status: ACTIVE | Noted: 2023-01-30

## 2023-01-30 PROBLEM — H01.001 BLEPHARITIS;  , RIGHT LOWER LID, RIGHT UPPER LID , LEFT UPPER LID, LEFT LOWER LID: Status: ACTIVE | Noted: 2023-01-30

## 2023-01-30 PROCEDURE — 99213 OFFICE O/P EST LOW 20 MIN: CPT | Performed by: OPHTHALMOLOGY

## 2023-01-30 PROCEDURE — 92285 EXTERNAL OCULAR PHOTOGRAPHY: CPT | Performed by: OPHTHALMOLOGY

## 2023-01-30 ASSESSMENT — VISUAL ACUITY
OS_BCVA: 20/30
OD_BCVA: 20/30

## 2023-01-30 ASSESSMENT — LID EXAM ASSESSMENTS
OS_BLEPHARITIS: LLL LUL 1+ 2+
OD_BLEPHARITIS: RLL RUL 1+ 2+

## 2023-01-30 ASSESSMENT — KERATOMETRY
OS_K1POWER_DIOPTERS: 46.25
OS_K2POWER_DIOPTERS: 46.50
OD_K1POWER_DIOPTERS: 43.50
OD_AXISANGLE_DEGREES: 103
OD_K2POWER_DIOPTERS: 44.00
OS_AXISANGLE_DEGREES: 102
METHOD_AUTO_MANUAL: AUTO

## 2023-01-30 ASSESSMENT — REFRACTION_AUTOREFRACTION
OS_CYLINDER: --0.50
OS_AXIS: 065
OS_SPHERE: -0.50
OD_SPHERE: -0.25
OD_AXIS: 084
OD_CYLINDER: -0.50

## 2023-01-30 ASSESSMENT — SPHEQUIV_DERIVED: OD_SPHEQUIV: -0.5

## 2023-01-30 ASSESSMENT — AXIALLENGTH_DERIVED: OD_AL: 23.6953

## 2023-01-30 ASSESSMENT — CONFRONTATIONAL VISUAL FIELD TEST (CVF)
OS_FINDINGS: FULL
OD_FINDINGS: FULL

## 2023-01-30 ASSESSMENT — LACRIMAL DUCT - ASSESSMENT: OD_LACRIMAL_DUCT: PASSAGEWAY OPEN UPON IRRIGATION.

## 2023-01-30 ASSESSMENT — CORNEAL EDEMA CLINICAL DESCRIPTION: OD_CORNEALEDEMA: ABSENT

## 2023-02-03 ENCOUNTER — APPOINTMENT (OUTPATIENT)
Dept: PLASTIC SURGERY | Facility: CLINIC | Age: 68
End: 2023-02-03
Payer: MEDICARE

## 2023-02-03 VITALS
WEIGHT: 134 LBS | DIASTOLIC BLOOD PRESSURE: 84 MMHG | OXYGEN SATURATION: 99 % | BODY MASS INDEX: 28.13 KG/M2 | SYSTOLIC BLOOD PRESSURE: 164 MMHG | HEART RATE: 65 BPM | RESPIRATION RATE: 16 BRPM | HEIGHT: 58 IN

## 2023-02-03 PROCEDURE — 99213 OFFICE O/P EST LOW 20 MIN: CPT

## 2023-02-03 RX ORDER — ACETAMINOPHEN 325 MG
TABLET ORAL
Refills: 0 | Status: ACTIVE | COMMUNITY

## 2023-02-03 RX ORDER — MULTIVITAMIN
3 TABLET ORAL
Refills: 0 | Status: ACTIVE | COMMUNITY

## 2023-02-03 NOTE — REVIEW OF SYSTEMS
[Negative] : Heme/Lymph [FreeTextEntry3] : Cataract surgery  [de-identified] : Thyroid Nodule  [FreeTextEntry1] : Hx of breast cancer

## 2023-02-03 NOTE — PHYSICAL EXAM
[Normocephalic] : normocephalic [Atraumatic] : atraumatic [EOMI] : extra ocular movement intact [Sclera nonicteric] : sclera nonicteric [Supple] : supple [No Supraclavicular Adenopathy] : no supraclavicular adenopathy [No Cervical Adenopathy] : no cervical adenopathy [No Thyromegaly] : no thyromegaly [Clear to Auscultation Bilat] : clear to auscultation bilaterally [Normal Sinus Rhythm] : normal sinus rhythm [Examined in the supine and seated position] : examined in the supine and seated position [Asymmetrical] : asymmetrical [Bra Size: ___] : Bra Size: [unfilled] [No dominant masses] : no dominant masses in right breast  [No Nipple Retraction] : no left nipple retraction [No Nipple Discharge] : no left nipple discharge [No Axillary Lymphadenopathy] : no left axillary lymphadenopathy [No Edema] : no edema [No Rashes] : no rashes [No Ulceration] : no ulceration [de-identified] : her right breast sits lower than her left [de-identified] : well healed left upper inner. upper outer, lower outer breast incisions.  At the site of the patient's concern, there is a 1 x 1 mm skin lesion located left 530, 5.5 cm from the nipple.  The appearance is of a circular vesicular-like lesion of unknown clear etiology.  It appears dry.  There is no spontaneous drainage.

## 2023-02-03 NOTE — HISTORY OF PRESENT ILLNESS
[FreeTextEntry1] : Patient is a 67 year old female here today for a consultation visit with concerns regarding drainage from her left lower breast. She was advised to see me by Dr. Ding.\par She is s/p left lumpectomy and left axillary sentinel LN biopsy 5/13/2016 by Dr. Ding, followed by radiation therapy. 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  ER positive greater than 90%/NH positive greater than 90%/HER2/lala negative.\par There is no family history of breast or ovarian cancer.\par She is also status post benign left stereotactic biopsy 4/22/2016 for a grouping of calcifications at 12:00.\par Med onc: Dr. Roby Henry: She initially took Anastrozole but had adverse effects.  She then tried tamoxifen but did not tolerate it.  She states she only took the medications for approximately 1 month.\par 6/27/2022 Bilateral  mammogram: No suspicious mass, suspicious microcalcifications, or other sign of malignancy is identified.\par Postsurgical changes are visualized in the upper central left breast. A biopsy marker is visualized in the upper slightly outer left breast.\par Bilateral scattered benign type calcifications.\par Bilateral breast ultrasound: Right breast: No suspicious solid mass.\par Left breast: No suspicious solid mass. 11 o'clock 12 cm from the nipple is stable postsurgical scarring.\par BI-RADS 2. Mammogram recommended in 1 year.\par She states that in the shower last week she noted a raised pea-sized mass of the left lower breast.  She denied any associated redness.  Shortly thereafter she noted spontaneous drainage of clear fluid.  Her  showed me a picture which revealed serous fluid coming from the site.  She states that the fluid was also slightly sticky.  It was not purulent.  She states that since the drainage the mass is smaller and now feels as if there is a overlying scab.  She saw our her medical oncologist PA last week.  She now presents to me for evaluation.\par

## 2023-02-03 NOTE — PHYSICAL EXAM
[Normocephalic] : normocephalic [Atraumatic] : atraumatic [EOMI] : extra ocular movement intact [Sclera nonicteric] : sclera nonicteric [Supple] : supple [No Supraclavicular Adenopathy] : no supraclavicular adenopathy [No Cervical Adenopathy] : no cervical adenopathy [No Thyromegaly] : no thyromegaly [Clear to Auscultation Bilat] : clear to auscultation bilaterally [Normal Sinus Rhythm] : normal sinus rhythm [Examined in the supine and seated position] : examined in the supine and seated position [Asymmetrical] : asymmetrical [Bra Size: ___] : Bra Size: [unfilled] [No dominant masses] : no dominant masses in right breast  [No Nipple Retraction] : no left nipple retraction [No Nipple Discharge] : no left nipple discharge [No Axillary Lymphadenopathy] : no left axillary lymphadenopathy [No Edema] : no edema [No Rashes] : no rashes [No Ulceration] : no ulceration [de-identified] : her right breast sits lower than her left [de-identified] : well healed left upper inner. upper outer, lower outer breast incisions.  At the site of the patient's concern, there is a 1 x 1 mm skin lesion located left 530, 5.5 cm from the nipple.  The appearance is of a circular vesicular-like lesion of unknown clear etiology.  It appears dry.  There is no spontaneous drainage.

## 2023-02-03 NOTE — REVIEW OF SYSTEMS
[Negative] : Heme/Lymph [FreeTextEntry3] : Cataract surgery  [de-identified] : Thyroid Nodule  [FreeTextEntry1] : Hx of breast cancer

## 2023-02-03 NOTE — CONSULT LETTER
[Dear  ___] : Dear  [unfilled], [Consult Letter:] : I had the pleasure of evaluating your patient, [unfilled]. [Please see my note below.] : Please see my note below. [Sincerely,] : Sincerely, [DrElisha  ___] : Dr. ODELL [DrElisha ___] : Dr. ODELL

## 2023-02-03 NOTE — ASSESSMENT
[FreeTextEntry1] : history of left breast cancer, Stage I\par Clinical breast exam no evidence of disease recurrence.\par Left breast skin lesion with history of spontaneous clear drainage, consistent with a dermatologic issue.\par \par 1. Annual bilateral mammogram and breast ultrasound due 6/2023. She has an appt and a Rx.\par 2. Follow up office visit due 1 year\par 3. Advised monthly self breast examinations and advised her to contact me if she has any concerns. \par 4.  I advised her to see her dermatologist, Dr. Janette Mcguire for further evaluation.  She states she will do so.

## 2023-02-07 NOTE — ED PROVIDER NOTE - CHIEF COMPLAINT
OPERATIVE REPORT  PATIENT NAME: Rowena Bolden    :  1939  MRN: [id number]  Pt Location: [facility] OR ROOM 05    SURGERY DATE: 2023    Surgeon(s) and Role:  Panel 1:     * Drew Cornejo MD - Primary     * Racquel Mcleod DO - Assisting  Panel 2:     * Nguyen Han MD - Primary    Preop Diagnosis:  Malignant melanoma of nose ([address] ) [C43 31]    Post-Op Diagnosis Codes:     * Malignant melanoma of nose ([address] ) [C43 31]    Procedure(s):  Left - WIDE EXCISION OF LEFT NASAL MELANOMA  Left - APPLICATION OF SKIN SUBSTITUTE GRAFT TO NOSE  POSSIBLE FTSG  POSSIBLE CARTILAGE GRAFT FROM EAR    Specimen(s):  ID Type Source Tests Collected by Time Destination   1 : left nasal melanoma- 1 clip marks 12 oclock, 2 clips emmanuelle 3 oclock, 3 clips emmanuelle 6 oclock, 4 clips emmanuelle 9 oclock Tissue Nose TISSUE EXAM Drew Cornejo MD 2023 1407        Estimated Blood Loss:   Minimal    Drains:  * No LDAs found *    Anesthesia Type:   General    Operative Indications:  Malignant melanoma of nose ([address] ) [C43 31]      Operative Findings:  Left nasal skin defect 3 0 x 2 0 x 0 5 cm    Complications:   None    Procedure and Technique:  Patient is an 54-year-old man with a melanoma on his left nasal alar skin  This measured 0 6 m in depth  He comes in for excision and reconstruction  He was brought to the OR and identified by timeout  Following this he was prepped and draped with the face and neck exposed after intubation by the anesthesia team   5 mm margins were drawn around the visible skin lesion which itself measured approximately 8 x 10 mm in size  Full 1 cm margins cannot be obtained based on location on the left alar cartilage  We then anesthetized with local anesthesia, then incised the margins sharply  Cautery and sharp dissection were used to obtain a full-thickness excision  The specimen was oriented with sutures and clips  Resulting defect measured 3 0 x 2 0 x 0 5 cm    Field was hemostatic upon completion of this portion  At this point Dr Molly Hoang came in for her portion of the operation  Reconstruction will be dictated under her cover         I was present for the entire procedure    Patient Disposition:  PACU     Wide Local Excision for Primary Cutaneous Melanoma - Excision 1 (Nose)  Operation performed with curative intent Yes   Original Breslow thickness of the lesion 0 6 mm (to the tenth of a millimeter)   Clinical margin width 0 5 cm   Depth of excision Full-thickness skin/subcutaneous tissue down to fascia (melanoma)         SIGNATURE: Donavan Swenson MD  DATE: February 7, 2023  TIME: 2:36 PM The patient is a 66y Female complaining of abdominal pain.

## 2023-02-11 ENCOUNTER — NON-APPOINTMENT (OUTPATIENT)
Age: 68
End: 2023-02-11

## 2023-03-16 ENCOUNTER — NON-APPOINTMENT (OUTPATIENT)
Age: 68
End: 2023-03-16

## 2023-03-20 ENCOUNTER — APPOINTMENT (OUTPATIENT)
Dept: RADIATION ONCOLOGY | Facility: CLINIC | Age: 68
End: 2023-03-20
Payer: MEDICARE

## 2023-03-20 VITALS
BODY MASS INDEX: 28.13 KG/M2 | TEMPERATURE: 97.16 F | HEIGHT: 58 IN | WEIGHT: 134 LBS | RESPIRATION RATE: 17 BRPM | SYSTOLIC BLOOD PRESSURE: 158 MMHG | OXYGEN SATURATION: 98 % | DIASTOLIC BLOOD PRESSURE: 93 MMHG | HEART RATE: 77 BPM

## 2023-03-20 PROCEDURE — 99204 OFFICE O/P NEW MOD 45 MIN: CPT

## 2023-03-20 NOTE — REVIEW OF SYSTEMS
[Anxiety] : anxiety [Constipation: Grade 0] : Constipation: Grade 0 [Diarrhea: Grade 0] : Diarrhea: Grade 0 [Fatigue: Grade 0] : Fatigue: Grade 0 [Breast Pain: Grade 0] : Breast Pain: Grade 0 [Dermatitis Radiation: Grade 0] : Dermatitis Radiation: Grade 0 [Negative] : Psychiatric

## 2023-03-22 ENCOUNTER — NON-APPOINTMENT (OUTPATIENT)
Age: 68
End: 2023-03-22

## 2023-03-24 NOTE — PHYSICAL EXAM
[Sclera] : the sclera and conjunctiva were normal [PERRL With Normal Accommodation] : pupils were equal in size, round, reactive to light [] : no respiratory distress [Respiration, Rhythm And Depth] : normal respiratory rhythm and effort [Exaggerated Use Of Accessory Muscles For Inspiration] : no accessory muscle use [Breast Palpation Mass] : no palpable masses [Breast Abnormal Lactation (Galactorrhea)] : no nipple discharge [No UE Edema] : there is no upper extremity edema [Normal] : no palpable adenopathy [Axillary Lymph Nodes Enlarged Bilaterally] : axillary [de-identified] : no skin lesions other than scattered flat nevi, mild dependent chronic edema

## 2023-03-24 NOTE — ADDENDUM
[FreeTextEntry1] : 3/20/23 The multidisciplinary consensus was to request the outside pathology of the biopsied lesion for staining for NMYC and ki67 and then re-evaluate after our pathologists have reviewed. We will request the slides.

## 2023-03-24 NOTE — REASON FOR VISIT
[Pathological -- TMN Staging] : TNM Stage: p [T: ___] : T[unfilled] [N: ___] : N[unfilled] [M: ___] : M[unfilled] [Re-evaluation] : re-evaluation for [Breast Cancer] : breast cancer [de-identified] : Left breast [Spouse] : spouse

## 2023-03-24 NOTE — HISTORY OF PRESENT ILLNESS
[FreeTextEntry1] : Ms. Shay is a 67-year-old woman with a history of stage IA [id number] moderately differentiated ductal carcinoma of the left breast ER 95% positive and WI 95% positive. She underwent a lumpectomy with negative margins and had a negative sentinel node biopsy. Adjuvant radiation therapy with a dose of 4240 cGy was given and completed on 07/14/16. \par \par She had banged her left lateral breast into a chair early January 2023 after which she had pain and some swelling, denies redness or bruising. A few weeks later, she noted that a clear discharge from the site was on her clothes, for two days in a row, then it stopped. On 3/2/34 she underwent a shave biopsy of a "flesh colored soft compressible papule" at the site of discharge.  The report showed lymphangiomatous proliferation, present at margins, with a note stating that if there is a history of radiation to the site then the lesion is best regarded as an atypical post radiation vascular proliferation and complete removal was recommended. \par \par At this time, she has no more discharge, denies breast pain, redness, swelling. \par \par She has not had significant drainage or experience change in size of the lump since january.\par \par

## 2023-04-26 ENCOUNTER — RESULT REVIEW (OUTPATIENT)
Age: 68
End: 2023-04-26

## 2023-05-10 ENCOUNTER — NON-APPOINTMENT (OUTPATIENT)
Age: 68
End: 2023-05-10

## 2023-05-11 ENCOUNTER — NON-APPOINTMENT (OUTPATIENT)
Age: 68
End: 2023-05-11

## 2023-05-12 ENCOUNTER — NON-APPOINTMENT (OUTPATIENT)
Age: 68
End: 2023-05-12

## 2023-05-18 ENCOUNTER — LABORATORY RESULT (OUTPATIENT)
Age: 68
End: 2023-05-18

## 2023-05-18 ENCOUNTER — APPOINTMENT (OUTPATIENT)
Dept: PLASTIC SURGERY | Facility: CLINIC | Age: 68
End: 2023-05-18
Payer: MEDICARE

## 2023-05-18 PROCEDURE — 99212 OFFICE O/P EST SF 10 MIN: CPT | Mod: 25

## 2023-05-18 PROCEDURE — 11400 EXC TR-EXT B9+MARG 0.5 CM<: CPT

## 2023-05-18 NOTE — ASSESSMENT
[FreeTextEntry1] : History of left breast cancer, Stage I\par Clinical breast exam no evidence of disease recurrence.\par Left breast skin lesion with history of spontaneous clear drainage, consistent with a lymphangioma\par \par 1. Advise left surgical excision of skin lesion. The procedure was reviewed with her in detail. The indications, alternatives, benefits and risks were discussed with her, including but not limited to bleeding,  pain, scar. Risks, benefits, and alternatives to proposed surgical procedure were reviewed and all questions were answered to her satisfaction. \par 2. Annual bilateral mammogram and breast ultrasound due 6/2023; She has an appt 6/28/23.\par 3.  Post excisional biopsy instructions were provided to her.  I instructed her to remove the Telfa and Tegaderm dressing in 2 days.  She may shower tomorrow.  I asked her to minimize vigorous physical activity to promote appropriate healing.  I provided her additional Telfa gauze to apply over the area on a daily basis.

## 2023-05-18 NOTE — HISTORY OF PRESENT ILLNESS
[FreeTextEntry1] : Patient is a 67 year old female here today for a follow up office visit\par She was referred to me by Dr. Ding regarding drainage from her left lower breast. \par She is s/p left lumpectomy and left axillary sentinel LN biopsy 5/13/2016 by Dr. Ding, followed by radiation therapy (Dr. Barger). 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  ER positive greater than 90%/[place] positive greater than 90%/HER2/lala negative.\par There is no family history of breast or ovarian cancer.\par She is also status post benign left stereotactic biopsy 4/22/2016 for a grouping of calcifications at 12:00.\par Med onc: Dr. Roby Henry: She initially took Anastrozole but had adverse effects.  She then tried tamoxifen but did not tolerate it.  She states she only took the medications for approximately 1 month.\par 6/27/2022 Bilateral  mammogram: No suspicious mass, suspicious microcalcifications, or other sign of malignancy is identified.\par Postsurgical changes are visualized in the upper central left breast. A biopsy marker is visualized in the upper slightly outer left breast.\par Bilateral scattered benign type calcifications.\par Bilateral breast ultrasound: Right breast: No suspicious solid mass.\par Left breast: No suspicious solid mass. 11 o'clock 12 cm from the nipple is stable postsurgical scarring.\par BI-RADS 2. Mammogram recommended in 1 year.\par She states that in the shower 1/2023 she noted a raised pea-sized mass of the left lower breast.  She denied any associated redness.  Shortly thereafter she noted spontaneous drainage of clear fluid.  Her  showed me a picture which revealed serous fluid coming from the site.  She states that the fluid was also slightly sticky.  It was not purulent.  She states that since the drainage the mass is smaller and now feels as if there is a overlying scab.  \par Shave biopsy performed by dermatologist Dr. Mcguire. Pathology revealed lymph angiomatous proliferation. Present at margins. The report states that if there is a history of radiation to the site then the lesion is best regarded as an atypical post radiation vascular proliferation. Clinical correlation is necessary. The pathologist is recommending complete removal of the lesion and clinical follow-up.\par She saw Dr. Barger 3/20/2023\par Slide review NS revealed atypical vascular lesion, CMYC negative (favorable, as CMYC + stain would be c/w angiosarcoma).\par I spoke to Dr. Barger. We advised pt to follow-up with me and that if there was any residual skin lesion, to perform excision of the skin lesion for additional pathologic evaluation.\par She reports coldness of the left upper outer breast. She states she has not had any recurrent vesicular like skin lesion and that the site of the previous vesicular skin lesion is now flat.

## 2023-05-18 NOTE — PHYSICAL EXAM
[de-identified] : Her right breast sits lower than her left [de-identified] : At the site of the patient's prior skin lesion left 5:30, 5.5 cm from the nipple, there is a brownish discoloration consistent with postbiopsy changes. No recurrent vesicular lesion.

## 2023-05-18 NOTE — PROCEDURE
[___ ml Inj] : [unfilled] ~Uml [1%] : 1% [Without Epi] : without epinephrine [Betadine] : using betadine [Excisional: Margin ___mm] : the lesion was excised with a  [unfilled] ~Umm margin in an elliptical fashion [Simple Sutures] : simple sutures were used for the skin closure [Interrupted] : interrupted [Chromic] : chromic suture(s) [FreeTextEntry8] : left 5:30, 5.5 cm from the nipple [de-identified] : fast absorbing

## 2023-05-30 ENCOUNTER — APPOINTMENT (OUTPATIENT)
Dept: PLASTIC SURGERY | Facility: CLINIC | Age: 68
End: 2023-05-30
Payer: MEDICARE

## 2023-05-30 VITALS
BODY MASS INDEX: 28.13 KG/M2 | SYSTOLIC BLOOD PRESSURE: 178 MMHG | HEART RATE: 64 BPM | HEIGHT: 58 IN | WEIGHT: 134 LBS | DIASTOLIC BLOOD PRESSURE: 79 MMHG | OXYGEN SATURATION: 98 % | TEMPERATURE: 206.96 F

## 2023-05-30 DIAGNOSIS — L98.8 OTHER SPECIFIED DISORDERS OF THE SKIN AND SUBCUTANEOUS TISSUE: ICD-10-CM

## 2023-05-30 PROCEDURE — 99212 OFFICE O/P EST SF 10 MIN: CPT

## 2023-05-30 NOTE — CONSULT LETTER
Impression: S/P Right lower eylid ectropion repair with canthoplasty skin graft and temporary tarsorrhaphy OD - 50 Days. Encounter for other specified surgical aftercare  Z48.89. Post operative instructions reviewed - Plan: *Patient is currently being treated during COVID-19 epidemic, which limits our availability to establish proper follow up care. Patient understands these limitations, and is aware that we will continue treatment and follow up based on our availability, as determined by local state and federal guidelines. -- Patient to continue Erythromycin eileen at night, for two weeks -- Patient to begin warm compresses BID, for two weeks -- Patient instructed to wash with mild soap and warm water -- Patient to gradually resume normal activities -- Expressed importance of UV Protection [Dear  ___] : Dear  [unfilled], [Courtesy Letter:] : I had the pleasure of seeing your patient, [unfilled], in my office today. [Please see my note below.] : Please see my note below. [Sincerely,] : Sincerely, [DrElisha  ___] : Dr. ODELL [DrElisha ___] : Dr. ODELL [FreeTextEntry3] : Carla Arce, RPA-C\par Breast Surgery\par [address]\par [address]\[place] [address]\par (Phone) [phone]\par (Fax) [phone]

## 2023-05-30 NOTE — ASSESSMENT
[FreeTextEntry1] : History of left breast cancer, Stage I\par Clinical breast exam no evidence of disease recurrence.\par Left breast skin lesion with history of spontaneous clear drainage, consistent with a lymphangioma s/p skin excision, pathology pending\par \par 1. Annual bilateral mammogram and breast ultrasound due 6/2023; She has an appt 6/28/23.\par 2. Follow up office visit due 2/2024\par 3. Advised monthly self breast examinations and advised her to contact me if she has any concerns. \par 4. We will call her once we receive the pathology results

## 2023-05-30 NOTE — HISTORY OF PRESENT ILLNESS
[FreeTextEntry1] : Patient is a 67 year old female here today for a wound check.\par She was referred to me by Dr. Ding regarding drainage from her left lower breast. \par She is s/p left lumpectomy and left axillary sentinel LN biopsy 5/13/2016 by Dr. Ding, followed by radiation therapy (Dr. Barger). 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  ER positive greater than 90%/[place] positive greater than 90%/HER2/lala negative.\par There is no family history of breast or ovarian cancer.\par She is also status post benign left stereotactic biopsy 4/22/2016 for a grouping of calcifications at 12:00.\par Med onc: Dr. Roby Henry: She initially took Anastrozole but had adverse effects.  She then tried tamoxifen but did not tolerate it.  She states she only took the medications for approximately 1 month.\par 6/27/2022 Bilateral  mammogram: No suspicious mass, suspicious microcalcifications, or other sign of malignancy is identified.\par Postsurgical changes are visualized in the upper central left breast. A biopsy marker is visualized in the upper slightly outer left breast.\par Bilateral scattered benign type calcifications.\par Bilateral breast ultrasound: Right breast: No suspicious solid mass.\par Left breast: No suspicious solid mass. 11 o'clock 12 cm from the nipple is stable postsurgical scarring.\par BI-RADS 2. Mammogram recommended in 1 year.\par She states that in the shower 1/2023 she noted a raised pea-sized mass of the left lower breast.  She denied any associated redness.  Shortly thereafter she noted spontaneous drainage of clear fluid.  Her  showed me a picture which revealed serous fluid coming from the site.  She states that the fluid was also slightly sticky.  It was not purulent.  She states that since the drainage the mass is smaller and now feels as if there is a overlying scab.  \par Shave biopsy performed by dermatologist Dr. Mcguire. Pathology revealed lymph angiomatous proliferation. Present at margins. The report states that if there is a history of radiation to the site then the lesion is best regarded as an atypical post radiation vascular proliferation. Clinical correlation is necessary. The pathologist is recommending complete removal of the lesion and clinical follow-up.\par She saw Dr. Barger 3/20/2023\par Slide review NS revealed atypical vascular lesion, CMYC negative (favorable, as CMYC + stain would be c/w angiosarcoma).\par I spoke to Dr. Barger. We advised pt to follow-up with me and that if there was any residual skin lesion, to perform excision of the skin lesion for additional pathologic evaluation.\par 5/18/2023 s/p in office skin excision biopsy, pathology pending\par She states that she doesn't feel the stitches anymore but has some residual redness.

## 2023-05-30 NOTE — PHYSICAL EXAM
[Normocephalic] : normocephalic [Atraumatic] : atraumatic [EOMI] : extra ocular movement intact [Sclera nonicteric] : sclera nonicteric [Supple] : supple [No Supraclavicular Adenopathy] : no supraclavicular adenopathy [Examined in the supine and seated position] : examined in the supine and seated position [Asymmetrical] : asymmetrical [Bra Size: ___] : Bra Size: [unfilled] [No Nipple Retraction] : no left nipple retraction [No Nipple Discharge] : no left nipple discharge [No Axillary Lymphadenopathy] : no left axillary lymphadenopathy [No Edema] : no edema [No Rashes] : no rashes [No Ulceration] : no ulceration [No dominant masses] : no dominant masses left breast [de-identified] : Her right breast sits lower than her left [de-identified] : Focused exam:\par 5:30 (N5.5cm) at the site of the skin excision. C/D/I, no erythema or warmth, no evidence of infection, mild residual reactive hyperpigmentation, no stitches present.

## 2023-06-01 ENCOUNTER — APPOINTMENT (OUTPATIENT)
Dept: PLASTIC SURGERY | Facility: CLINIC | Age: 68
End: 2023-06-01

## 2023-06-08 ENCOUNTER — NON-APPOINTMENT (OUTPATIENT)
Age: 68
End: 2023-06-08

## 2023-06-28 ENCOUNTER — APPOINTMENT (OUTPATIENT)
Dept: ULTRASOUND IMAGING | Facility: IMAGING CENTER | Age: 68
End: 2023-06-28
Payer: MEDICARE

## 2023-06-28 ENCOUNTER — APPOINTMENT (OUTPATIENT)
Dept: MAMMOGRAPHY | Facility: IMAGING CENTER | Age: 68
End: 2023-06-28
Payer: MEDICARE

## 2023-06-28 ENCOUNTER — RESULT REVIEW (OUTPATIENT)
Age: 68
End: 2023-06-28

## 2023-06-28 ENCOUNTER — OUTPATIENT (OUTPATIENT)
Dept: OUTPATIENT SERVICES | Facility: HOSPITAL | Age: 68
LOS: 1 days | End: 2023-06-28
Payer: MEDICARE

## 2023-06-28 DIAGNOSIS — Z98.89 OTHER SPECIFIED POSTPROCEDURAL STATES: Chronic | ICD-10-CM

## 2023-06-28 DIAGNOSIS — D05.10 INTRADUCTAL CARCINOMA IN SITU OF UNSPECIFIED BREAST: ICD-10-CM

## 2023-06-28 PROCEDURE — 77063 BREAST TOMOSYNTHESIS BI: CPT | Mod: 26

## 2023-06-28 PROCEDURE — 76641 ULTRASOUND BREAST COMPLETE: CPT | Mod: 26,50,GY

## 2023-06-28 PROCEDURE — 76536 US EXAM OF HEAD AND NECK: CPT | Mod: 26

## 2023-06-28 PROCEDURE — 77067 SCR MAMMO BI INCL CAD: CPT

## 2023-06-28 PROCEDURE — 77067 SCR MAMMO BI INCL CAD: CPT | Mod: 26

## 2023-06-28 PROCEDURE — 76641 ULTRASOUND BREAST COMPLETE: CPT

## 2023-06-28 PROCEDURE — 76536 US EXAM OF HEAD AND NECK: CPT

## 2023-06-28 PROCEDURE — 77063 BREAST TOMOSYNTHESIS BI: CPT

## 2023-07-13 ENCOUNTER — NON-APPOINTMENT (OUTPATIENT)
Age: 68
End: 2023-07-13

## 2023-07-14 ENCOUNTER — NON-APPOINTMENT (OUTPATIENT)
Age: 68
End: 2023-07-14

## 2023-08-24 ENCOUNTER — NON-APPOINTMENT (OUTPATIENT)
Age: 68
End: 2023-08-24

## 2023-09-05 ENCOUNTER — NON-APPOINTMENT (OUTPATIENT)
Age: 68
End: 2023-09-05

## 2023-09-05 NOTE — HISTORY OF PRESENT ILLNESS
[FreeTextEntry1] : Patient is a 67 year old female here today for a follow up visit.  She was referred to me by Dr. Ding regarding drainage from her left lower breast. She is s/p left lumpectomy and left axillary sentinel LN biopsy 5/13/2016 by Dr. Ding, followed by radiation therapy (Dr. Barger). Pathology revealed invasive ductal carcinoma measuring 0.9 cm with DCIS, negative sentinel lymph nodes. [place] positive greater than 90%/[place] positive greater than 90%/HER2/lala negative. There is no family history of breast or ovarian cancer. She is also status post benign left stereotactic biopsy 4/22/2016 for a grouping of calcifications at 12:00. Med onc: Dr. Roby Henry: She initially took Anastrozole but had adverse effects. She then tried tamoxifen but did not tolerate it. She states she only took the medications for approximately 1 month. 6/27/2022 Bilateral mammogram: No suspicious mass, suspicious microcalcifications, or other sign of malignancy is identified. Postsurgical changes are visualized in the upper central left breast. A biopsy marker is visualized in the upper slightly outer left breast. Bilateral scattered benign type calcifications. Bilateral breast ultrasound: Right breast: No suspicious solid mass. Left breast: No suspicious solid mass. 11 o'clock 12 cm from the nipple is stable postsurgical scarring. BI-RADS 2. Mammogram recommended in 1 year. She states that in the shower 1/2023 she noted a raised pea-sized mass of the left lower breast. She denied any associated redness. Shortly thereafter she noted spontaneous drainage of clear fluid. Her  showed me a picture which revealed serous fluid coming from the site. She states that the fluid was also slightly sticky. It was not purulent. She states that since the drainage the mass is smaller and now feels as if there is a overlying scab. Shave biopsy performed by dermatologist Dr. Mcguire. Pathology revealed lymph angiomatous proliferation. Present at margins. The report states that if there is a history of radiation to the site then the lesion is best regarded as an atypical post radiation vascular proliferation. Clinical correlation is necessary. The pathologist is recommending complete removal of the lesion and clinical follow-up. She saw Dr. Barger 3/20/2023 Slide review NS revealed atypical vascular lesion, CMYC negative (favorable, as CMYC + stain would be c/w angiosarcoma). I spoke to Dr. Barger. We advised pt to follow-up with me and that if there was any residual skin lesion, to perform excision of the skin lesion for additional pathologic evaluation. 5/18/2023 s/p in office skin excision biopsy: pathology revealed:  Skin with scar and scattered ectatic vessels (see note). Abnormal c-myc FISH study (see note).  Amendement: This amendment is issued to communicate additional information as a result  of the C-myc fluorescent in situ hybridization (FISH) study performed at [facility]. Additional H T E levels that are deep to the FISH study levels were also examined. This report supersedes the prior report of this same number which is to be disregarded. 6/28/2023 Bilateral mammogram: No suspicious mass, suspicious microcalcifications, or other sign of malignancy is identified. Postsurgical changes are visualized in the upper central to inner left breast. A biopsy marker is visualized in the upper central left breast. Scattered bilateral benign type calcifications.  Bilateral ultrasound: Right: No suspicious solid mass. Left: No suspicious solid mass. 11 o'clock 12 cm from the nipple is a postsurgical scar, unchanged. Mammogram recommended in 1 year. BI-RADS 2

## 2023-09-07 ENCOUNTER — APPOINTMENT (OUTPATIENT)
Dept: PLASTIC SURGERY | Facility: CLINIC | Age: 68
End: 2023-09-07

## 2023-09-08 ENCOUNTER — NON-APPOINTMENT (OUTPATIENT)
Age: 68
End: 2023-09-08

## 2023-09-11 ENCOUNTER — APPOINTMENT (OUTPATIENT)
Dept: RADIATION ONCOLOGY | Facility: CLINIC | Age: 68
End: 2023-09-11
Payer: MEDICARE

## 2023-09-11 VITALS
OXYGEN SATURATION: 100 % | HEART RATE: 71 BPM | WEIGHT: 134.7 LBS | DIASTOLIC BLOOD PRESSURE: 99 MMHG | RESPIRATION RATE: 18 BRPM | HEIGHT: 58 IN | TEMPERATURE: 97.9 F | SYSTOLIC BLOOD PRESSURE: 166 MMHG | BODY MASS INDEX: 28.28 KG/M2

## 2023-09-11 PROCEDURE — 99212 OFFICE O/P EST SF 10 MIN: CPT | Mod: GC

## 2023-10-09 ENCOUNTER — OFFICE (OUTPATIENT)
Facility: LOCATION | Age: 68
Setting detail: OPHTHALMOLOGY
End: 2023-10-09
Payer: MEDICARE

## 2023-10-09 DIAGNOSIS — H05.811: ICD-10-CM

## 2023-10-09 DIAGNOSIS — H04.89: ICD-10-CM

## 2023-10-09 DIAGNOSIS — H04.223: ICD-10-CM

## 2023-10-09 PROCEDURE — 99213 OFFICE O/P EST LOW 20 MIN: CPT | Performed by: OPHTHALMOLOGY

## 2023-10-09 ASSESSMENT — LID EXAM ASSESSMENTS
OD_BLEPHARITIS: RLL RUL 1+ 2+
OS_BLEPHARITIS: LLL LUL 1+ 2+

## 2023-10-09 ASSESSMENT — CONFRONTATIONAL VISUAL FIELD TEST (CVF)
OD_FINDINGS: FULL
OS_FINDINGS: FULL

## 2023-10-09 ASSESSMENT — CORNEAL EDEMA CLINICAL DESCRIPTION: OD_CORNEALEDEMA: ABSENT

## 2023-10-09 ASSESSMENT — LACRIMAL DUCT - ASSESSMENT: OD_LACRIMAL_DUCT: PASSAGEWAY OPEN UPON IRRIGATION.

## 2023-10-23 PROBLEM — H01.004 BLEPHARITIS;  , RIGHT LOWER LID, RIGHT UPPER LID , LEFT UPPER LID, LEFT LOWER LID: Status: ACTIVE | Noted: 2023-10-09

## 2023-10-23 PROBLEM — H01.002 BLEPHARITIS;  , RIGHT LOWER LID, RIGHT UPPER LID , LEFT UPPER LID, LEFT LOWER LID: Status: ACTIVE | Noted: 2023-10-09

## 2023-10-23 PROBLEM — H01.001 BLEPHARITIS;  , RIGHT LOWER LID, RIGHT UPPER LID , LEFT UPPER LID, LEFT LOWER LID: Status: ACTIVE | Noted: 2023-10-09

## 2023-10-23 PROBLEM — H01.005 BLEPHARITIS;  , RIGHT LOWER LID, RIGHT UPPER LID , LEFT UPPER LID, LEFT LOWER LID: Status: ACTIVE | Noted: 2023-10-09

## 2023-10-23 ASSESSMENT — KERATOMETRY
METHOD_AUTO_MANUAL: AUTO
OS_K2POWER_DIOPTERS: 46.50
OD_AXISANGLE_DEGREES: 103
OS_AXISANGLE_DEGREES: 102
OD_K1POWER_DIOPTERS: 43.50
OS_K1POWER_DIOPTERS: 46.25
OD_K2POWER_DIOPTERS: 44.00

## 2023-10-23 ASSESSMENT — REFRACTION_AUTOREFRACTION
OD_CYLINDER: -0.50
OS_AXIS: 065
OD_AXIS: 084
OS_CYLINDER: --0.50
OS_SPHERE: -0.50
OD_SPHERE: -0.25

## 2023-10-23 ASSESSMENT — SPHEQUIV_DERIVED: OD_SPHEQUIV: -0.5

## 2023-10-23 ASSESSMENT — VISUAL ACUITY
OD_BCVA: 20/30
OS_BCVA: 20/30

## 2023-10-23 ASSESSMENT — AXIALLENGTH_DERIVED: OD_AL: 23.6953

## 2023-11-01 ENCOUNTER — APPOINTMENT (OUTPATIENT)
Age: 68
End: 2023-11-01

## 2024-01-03 ENCOUNTER — APPOINTMENT (OUTPATIENT)
Age: 69
End: 2024-01-03

## 2024-01-03 ENCOUNTER — APPOINTMENT (OUTPATIENT)
Age: 69
End: 2024-01-03
Payer: MEDICARE

## 2024-01-03 PROCEDURE — 99213 OFFICE O/P EST LOW 20 MIN: CPT

## 2024-01-09 ENCOUNTER — OFFICE (OUTPATIENT)
Dept: URBAN - METROPOLITAN AREA CLINIC 90 | Facility: CLINIC | Age: 69
Setting detail: OPHTHALMOLOGY
End: 2024-01-09
Payer: MEDICARE

## 2024-01-09 DIAGNOSIS — H01.002: ICD-10-CM

## 2024-01-09 DIAGNOSIS — H43.393: ICD-10-CM

## 2024-01-09 DIAGNOSIS — H05.811: ICD-10-CM

## 2024-01-09 DIAGNOSIS — H04.89: ICD-10-CM

## 2024-01-09 DIAGNOSIS — Z96.1: ICD-10-CM

## 2024-01-09 DIAGNOSIS — Q14.1: ICD-10-CM

## 2024-01-09 DIAGNOSIS — H43.813: ICD-10-CM

## 2024-01-09 DIAGNOSIS — H01.004: ICD-10-CM

## 2024-01-09 DIAGNOSIS — H01.001: ICD-10-CM

## 2024-01-09 DIAGNOSIS — H01.005: ICD-10-CM

## 2024-01-09 PROCEDURE — 92014 COMPRE OPH EXAM EST PT 1/>: CPT | Performed by: OPHTHALMOLOGY

## 2024-01-09 ASSESSMENT — REFRACTION_MANIFEST
OD_CYLINDER: -0.50
OS_ADD: +2.50
OS_VA1: 20/20
OD_VA2: 20/20
OD_ADD: +2.50
OS_CYLINDER: -0.25
OS_SPHERE: -0.75
OD_SPHERE: -0.25
OS_AXIS: 095
OD_AXIS: 060
OD_VA1: 20/20-

## 2024-01-09 ASSESSMENT — SPHEQUIV_DERIVED
OD_SPHEQUIV: -0.5
OD_SPHEQUIV: -0.5
OS_SPHEQUIV: -0.75
OS_SPHEQUIV: -0.875

## 2024-01-09 ASSESSMENT — LID EXAM ASSESSMENTS
OS_BLEPHARITIS: LLL LUL 1+ 2+
OD_BLEPHARITIS: RLL RUL 1+ 2+

## 2024-01-09 ASSESSMENT — LACRIMAL DUCT - ASSESSMENT: OD_LACRIMAL_DUCT: PASSAGEWAY OPEN UPON IRRIGATION.

## 2024-01-09 ASSESSMENT — REFRACTION_AUTOREFRACTION
OS_AXIS: 083
OS_CYLINDER: -0.50
OD_SPHERE: -0.25
OS_SPHERE: -0.50
OD_CYLINDER: -0.50
OD_AXIS: 056

## 2024-01-09 ASSESSMENT — REFRACTION_CURRENTRX
OD_SPHERE: -1.00
OS_AXIS: 133
OS_CYLINDER: -0.50
OS_OVR_VA: 20/
OD_AXIS: 110
OD_VPRISM_DIRECTION: SV
OS_VPRISM_DIRECTION: SV
OS_SPHERE: -1.25
OD_OVR_VA: 20/
OD_CYLINDER: -0.50

## 2024-01-09 ASSESSMENT — CONFRONTATIONAL VISUAL FIELD TEST (CVF)
OS_FINDINGS: FULL
OD_FINDINGS: FULL

## 2024-01-19 ENCOUNTER — OUTPATIENT (OUTPATIENT)
Dept: OUTPATIENT SERVICES | Facility: HOSPITAL | Age: 69
LOS: 1 days | Discharge: HOME | End: 2024-01-19

## 2024-01-19 DIAGNOSIS — C50.912 MALIGNANT NEOPLASM OF UNSPECIFIED SITE OF LEFT FEMALE BREAST: ICD-10-CM

## 2024-01-19 DIAGNOSIS — Z98.89 OTHER SPECIFIED POSTPROCEDURAL STATES: Chronic | ICD-10-CM

## 2024-01-19 DIAGNOSIS — D05.10 INTRADUCTAL CARCINOMA IN SITU OF UNSPECIFIED BREAST: ICD-10-CM

## 2024-01-23 ENCOUNTER — APPOINTMENT (OUTPATIENT)
Dept: SURGERY | Facility: CLINIC | Age: 69
End: 2024-01-23
Payer: MEDICARE

## 2024-01-23 VITALS
DIASTOLIC BLOOD PRESSURE: 90 MMHG | OXYGEN SATURATION: 98 % | WEIGHT: 134 LBS | BODY MASS INDEX: 27.01 KG/M2 | HEIGHT: 59 IN | TEMPERATURE: 97.6 F | HEART RATE: 64 BPM | SYSTOLIC BLOOD PRESSURE: 150 MMHG

## 2024-01-23 DIAGNOSIS — E04.2 NONTOXIC MULTINODULAR GOITER: ICD-10-CM

## 2024-01-23 PROCEDURE — 99213 OFFICE O/P EST LOW 20 MIN: CPT

## 2024-01-26 ENCOUNTER — APPOINTMENT (OUTPATIENT)
Dept: HEMATOLOGY ONCOLOGY | Facility: CLINIC | Age: 69
End: 2024-01-26
Payer: MEDICARE

## 2024-01-26 VITALS
SYSTOLIC BLOOD PRESSURE: 163 MMHG | RESPIRATION RATE: 16 BRPM | TEMPERATURE: 97.4 F | BODY MASS INDEX: 27.06 KG/M2 | HEART RATE: 80 BPM | OXYGEN SATURATION: 99 % | WEIGHT: 134 LBS | DIASTOLIC BLOOD PRESSURE: 93 MMHG

## 2024-01-26 PROCEDURE — 99214 OFFICE O/P EST MOD 30 MIN: CPT

## 2024-01-26 NOTE — HISTORY OF PRESENT ILLNESS
[de-identified] : Patient with many year history of multinodular goiter with prior left biopsy benign 2019.  Patient denies dysphagia, change in voice, shortness of breath.  Patient was treated with radiation in the past for breast cancer 2016.  Most recent ultrasound July 2023: Right lobe 4.7 x 1.4 x 1.5 cm with 2 lower 10 mm colloid cysts without change compared to 2018.  Left lobe 5.1 x 1.7 x 1.7 cm with mid nodule measuring 18 x 13 x 15 mm stable compared to 2018. I have reviewed all old and new data and available images.

## 2024-01-26 NOTE — PHYSICAL EXAM
[de-identified] : no cervical or supraclavicular adenopathy, trachea midline, thyroid without enlargement or palpable mass [Normal] : no neck adenopathy [de-identified] : Skin:  normal appearance.  no rash, nodules, vesicles, or erythema, Musculoskeletal:  full range of motion and no deformities appreciated Neurological:  grossly intact Psychiatric:  oriented to person, place and time with appropriate affect

## 2024-01-26 NOTE — PHYSICAL EXAM
[Fully active, able to carry on all pre-disease performance without restriction] : Status 0 - Fully active, able to carry on all pre-disease performance without restriction [Normal] : affect appropriate [de-identified] : lumpectomy scar over the left breast; no abnl masses or palpable axillary LN; no skin changes over the lumpectomy site or breast  [de-identified] : macular rash over neck and back; blanches with palpation

## 2024-01-26 NOTE — HISTORY OF PRESENT ILLNESS
[Disease: _____________________] : Disease: [unfilled] [T: ___] : T[unfilled] [N: ___] : N[unfilled] [M: ___] : M[unfilled] [AJCC Stage: ____] : AJCC Stage: [unfilled] [de-identified] : 0.9 cm invasive ductal carcinoma ER > 90% AR>90% HER2 Negative SBR 6/9 0/1 SLN Negative  SBR 6/9 , (DCIS  0.2cm )  [de-identified] : Left breast cancer age 60 s/p Left partial mastectomy & SLN 5/13/16  RT with Dr Hailee Barger  She underwent trial of anastrozole which was tolerated every other day but developed tongue swelling and throat discomfort when taken every day. She had this tongue swelling and throat issue before and told this was due to menopausal symptom. She then tried exemestane which caused intolerable joint discomfort.   She had diagnostic mammogram on 4/2016 of left breast that revealed a 5 mm cluster of fine calcifications in the superior left upper breast 12 :00 axis and additional 5 mm cluster of calcifications. Stereotactic core needle biopsy of calcifications at 12:00 axis revealed fibroadenoma.  The left upper inner calcification biopsy revealed DCIS, solid pattern with high grade nuclear atypia and central necrosis, measuring 2 mm. She had MRI of the breast on 5/3/16 which showed far posterior left upper inner nodule along with biopsy proven DCIS in the posterior left upper inner breast. She had u/s guided biopsy of the lesion on 5/4/16 which showed left breast 11:00 invasive moderately differentiated ductal carcinoma that measured at least 0.7cm. On 5/2016, she underwent left lumpectomy with sentinel lymph node biopsy which showed invasive ductal carcinoma measuring 0.9 cm and DCIS measuring 0.2cm with negative sentinel lymph nodes. She started on anastrozole after completion of radiation on 8/2016. She had brief trial of anastrozole but had adverse effect and stopped medication within months. Did not continue on endocrine therapy due to AE. Had breast lesion s/p biopsy showing atypical vascular proliferation with complete excision showing no evidence of cmyc amplification 5/18/2023.  [de-identified] : She had last breast imaging 6/2023. Denies any new breast pain or chest wall changes. No back pain, cough or HA. She denies any new skin lesions over the L breast but has blushing over the back and chest wall when nervous. She has not been on endocrine therapy and did not like the way it made her feel. She has been up to date with radiation oncology and surgeon. She will be seeing Dr Barger in March. She has elevated BP during MD visits but BP normal at home with home cuff. Saw her PCP and had cardiac evaluation. Taking supplements including Vitamin D, melatonin and occasional alprazolam.  [de-identified] : Endocrine therapy: 9/2016 : tried tamoxifen, exemestane, and finally on anastrozole

## 2024-01-26 NOTE — ASSESSMENT
[FreeTextEntry1] : Patient with long history of thyroid nodules without suspicious findings on physical examination today or prior imaging.  I requested a repeat thyroid ultrasound June 2024, the patient will contact my office to review results.  If stable, RTO 1 year.  I have answered their questions to the best of my ability.

## 2024-01-26 NOTE — REVIEW OF SYSTEMS
[Diarrhea: Grade 0] : Diarrhea: Grade 0 [Skin Rash] : no skin rash [Skin Wound] : no skin wound [Negative] : Allergic/Immunologic [de-identified] : flushing of the skin

## 2024-01-26 NOTE — ASSESSMENT
[FreeTextEntry1] : She is a  67 y/o F with Stage I left breast cancer s/p lumpectomy and sentinel lymph node biopsy. She had tried endocrine therapy but did not tolerate. She is up to date with breast imaging last done 6/2023. She had skin biopsy over the breast with atypical vascuar changes with complete excision showing scar with scattered ectatic vessels. We reviewed signs and symptoms of breast cancer recurrence. No new signs or symptoms of recurrence.  We reviewed low fat diet and exercise daily to help improve breast cancer survival as per WINS study. She will continue with surveillance. Next follow up in 1 year but earlier if any new symptoms.

## 2024-02-02 ENCOUNTER — APPOINTMENT (OUTPATIENT)
Dept: PLASTIC SURGERY | Facility: CLINIC | Age: 69
End: 2024-02-02
Payer: MEDICARE

## 2024-02-02 VITALS
HEART RATE: 82 BPM | DIASTOLIC BLOOD PRESSURE: 97 MMHG | HEIGHT: 59 IN | OXYGEN SATURATION: 98 % | BODY MASS INDEX: 26.61 KG/M2 | RESPIRATION RATE: 16 BRPM | SYSTOLIC BLOOD PRESSURE: 165 MMHG | WEIGHT: 132 LBS

## 2024-02-02 DIAGNOSIS — C50.912 MALIGNANT NEOPLASM OF UNSPECIFIED SITE OF LEFT FEMALE BREAST: ICD-10-CM

## 2024-02-02 PROCEDURE — 99213 OFFICE O/P EST LOW 20 MIN: CPT

## 2024-02-02 NOTE — PHYSICAL EXAM
[Normocephalic] : normocephalic [Atraumatic] : atraumatic [EOMI] : extra ocular movement intact [Sclera nonicteric] : sclera nonicteric [Supple] : supple [No Supraclavicular Adenopathy] : no supraclavicular adenopathy [Examined in the supine and seated position] : examined in the supine and seated position [Asymmetrical] : asymmetrical [Bra Size: ___] : Bra Size: [unfilled] [No dominant masses] : no dominant masses in right breast  [No dominant masses] : no dominant masses left breast [No Nipple Retraction] : no left nipple retraction [No Nipple Discharge] : no left nipple discharge [No Axillary Lymphadenopathy] : no left axillary lymphadenopathy [No Edema] : no edema [No Rashes] : no rashes [No Ulceration] : no ulceration [de-identified] : Her right breast sits lower than her left

## 2024-02-02 NOTE — ASSESSMENT
[FreeTextEntry1] : History of left breast cancer, Stage I (5/2016) Clinical breast exam no evidence of disease recurrence. Left breast skin lesion with history of spontaneous clear drainage, consistent with a lymphangioma s/p skin excision, pathology benign  1. Annual bilateral mammogram and breast ultrasound due 6/2024 2. Follow up office visit due 12/2024 3. Advised monthly self breast examinations and advised her to contact me if she has any concerns.

## 2024-02-02 NOTE — HISTORY OF PRESENT ILLNESS
[FreeTextEntry1] : The patient is a 68 year old female here today for a follow up office visit. She was referred to me by Dr. Ding regarding drainage from her left lower breast. She is s/p left lumpectomy and left axillary sentinel LN biopsy 5/13/2016 by Dr. Ding, followed by radiation therapy (Dr. Barger). Pathology revealed invasive ductal carcinoma measuring 0.9 cm with DCIS, negative sentinel lymph nodes. [place] positive greater than 90%/[place] positive greater than 90%/HER2/lala negative. There is no family history of breast or ovarian cancer. She is also status post benign left stereotactic biopsy 4/22/2016 for a grouping of calcifications at 12:00. Med onc: Dr. Roby Henry: She initially took Anastrozole but had adverse effects. She then tried tamoxifen but did not tolerate it. She states she only took the medications for approximately 1 month. She saw her 1/26/2024 and will continue with surveillance.  She states that in the shower 1/2023 she noted a raised pea-sized mass of the left lower breast. She denied any associated redness. Shortly thereafter she noted spontaneous drainage of clear fluid. Her  showed me a picture which revealed serous fluid coming from the site. She states that the fluid was also slightly sticky. It was not purulent. She states that since the drainage the mass is smaller and now feels as if there is a overlying scab. Shave biopsy performed by dermatologist Dr. Mcguire. Pathology revealed lymph angiomatous proliferation. Present at margins. The report states that if there is a history of radiation to the site then the lesion is best regarded as an atypical post radiation vascular proliferation. Clinical correlation is necessary. The pathologist is recommending complete removal of the lesion and clinical follow-up. Slide review NS revealed atypical vascular lesion, CMYC negative (favorable, as CMYC + stain would be c/w angiosarcoma). I spoke to Dr. Barger. We advised pt to follow-up with me and that if there was any residual skin lesion, to perform excision of the skin lesion for additional pathologic evaluation. 5/18/2023 s/p in office skin excision biopsy, pathology Skin with scar and scattered ectatic vessels, c-myc FISH negative. 6/28/2023 Bilateral Mammogram: ([facility]) No suspicious mass, suspicious microcalcifications, or other sign of malignancy is identified. Postsurgical changes are visualized in the upper central to inner left breast. A biopsy marker is visualized in the upper central left breast. Scattered bilateral benign type calcifications.  6/28/2023 Bilateral Breast Ultrasound: Right-No suspicious solid mass. Left- No suspicious solid mass. 11:00 12cmFN is a postsurgical scar, unchanged. Impression- No mammographic or sonographic evidence of malignancy. Recommendation- Mammography in 1 year. BI-RADS 2.  She saw Dr. Barger for follow up 9/11/2023, next appointment in March. She denies any current breast concerns  Up to date with MDs

## 2024-02-02 NOTE — CONSULT LETTER
[Dear  ___] : Dear  [unfilled], [Courtesy Letter:] : I had the pleasure of seeing your patient, [unfilled], in my office today. [Please see my note below.] : Please see my note below. [Sincerely,] : Sincerely, [DrElisha  ___] : Dr. ODELL [DrElisha ___] : Dr. ODELL [FreeTextEntry3] : Carla Arce, RPA-C\par  Breast Surgery\par  [address]\par  [address]\[place] [address]\par  (Phone) [phone]\par  (Fax) [phone]

## 2024-03-11 ENCOUNTER — OFFICE (OUTPATIENT)
Facility: LOCATION | Age: 69
Setting detail: OPHTHALMOLOGY
End: 2024-03-11
Payer: MEDICARE

## 2024-03-11 DIAGNOSIS — H05.811: ICD-10-CM

## 2024-03-11 PROBLEM — H01.001 BLEPHARITIS;  , RIGHT LOWER LID, RIGHT UPPER LID , LEFT UPPER LID, LEFT LOWER LID: Status: ACTIVE | Noted: 2024-03-11

## 2024-03-11 PROBLEM — H01.004 BLEPHARITIS;  , RIGHT LOWER LID, RIGHT UPPER LID , LEFT UPPER LID, LEFT LOWER LID: Status: ACTIVE | Noted: 2024-03-11

## 2024-03-11 PROBLEM — H01.005 BLEPHARITIS;  , RIGHT LOWER LID, RIGHT UPPER LID , LEFT UPPER LID, LEFT LOWER LID: Status: ACTIVE | Noted: 2024-03-11

## 2024-03-11 PROBLEM — H01.002 BLEPHARITIS;  , RIGHT LOWER LID, RIGHT UPPER LID , LEFT UPPER LID, LEFT LOWER LID: Status: ACTIVE | Noted: 2024-03-11

## 2024-03-11 PROCEDURE — 99213 OFFICE O/P EST LOW 20 MIN: CPT | Performed by: OPHTHALMOLOGY

## 2024-03-11 ASSESSMENT — REFRACTION_CURRENTRX
OS_VPRISM_DIRECTION: SV
OS_SPHERE: -1.25
OD_OVR_VA: 20/
OS_OVR_VA: 20/
OD_VPRISM_DIRECTION: SV
OD_AXIS: 110
OD_CYLINDER: -0.50
OS_AXIS: 133
OD_SPHERE: -1.00
OS_CYLINDER: -0.50

## 2024-03-11 ASSESSMENT — LID EXAM ASSESSMENTS
OS_BLEPHARITIS: LLL LUL 1+ 2+
OD_BLEPHARITIS: RLL RUL 1+ 2+

## 2024-04-04 NOTE — HISTORY OF PRESENT ILLNESS
[FreeTextEntry1] : Ms. 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 She underwent a lumpectomy with negative margins and had a negative sentinel node biopsy. Adjuvant radiation therapy with a dose of 4240 cGy was given and completed on 07/14/16. She returns today for follow up.    On 3/2/23, she underwent a shave biopsy of a "flesh colored soft compressible papule" at the site of discharge. The report showed lymphangiomatous proliferation, present at margins, with a note stating that if there is a history of radiation to the site then the lesion is best regarded as an atypical post radiation vascular proliferation and complete removal was recommended.  Pathology slide review at [facility] was performed with additional immunostains which showed that the lesional endothelial cells were positive for CD34, partially positive for D2-40, and negative for c-myc. Ki-67 showed a low mitotic index. These findings were consistent with atypical vascular lesion.   She underwent excision of the lesion on 5/18/23 with pathology showing skin with scar and scattered ectatic vessels. There was no multi-layering of the endothelium and no significant nuclear atypia. Immunostains showed ERG expression in all endothelial cells and D2-40 expression in ectatic vessels. Immunostain for c-myc was negative.   Subsequent FISH for MYC (8q24) on 3/3/23 specimen was negative, suggesting no evidence of MYC gene amplification.   Screening mammography and breast ultrasound on 6/28/23 showed no evidence of malignancy.

## 2024-04-04 NOTE — PHYSICAL EXAM
[Sclera] : the sclera and conjunctiva were normal [] : no respiratory distress [Heart Rate And Rhythm] : heart rate and rhythm were normal [Abdomen Soft] : soft [Nondistended] : nondistended [Axillary Lymph Nodes Enlarged Bilaterally] : axillary [Normal] : oriented to person, place and time, the affect was normal, the mood was normal and not anxious [de-identified] : well-healed surgical scars on upper and inferior breast, approx 1 cm scar at inferior breast is well healed, there is no rash, skin lesion or soft tissue nodule

## 2024-04-04 NOTE — PHYSICAL EXAM
[Sclera] : the sclera and conjunctiva were normal [] : no respiratory distress [Heart Rate And Rhythm] : heart rate and rhythm were normal [Abdomen Soft] : soft [Nondistended] : nondistended [Axillary Lymph Nodes Enlarged Bilaterally] : axillary [Normal] : oriented to person, place and time, the affect was normal, the mood was normal and not anxious [de-identified] : well-healed surgical scars on upper and inferior breast, approx 1 cm scar at inferior breast is well healed, there is no rash, skin lesion or soft tissue nodule

## 2024-04-04 NOTE — HISTORY OF PRESENT ILLNESS
[FreeTextEntry1] : Ms. Shay is a 67-year-old woman with a history of stage IA [id number] moderately differentiated ductal carcinoma of the left breast ER 95% positive and IL 95% positive. She underwent a lumpectomy with negative margins and had a negative sentinel node biopsy. Adjuvant radiation therapy with a dose of 4240 cGy was given and completed on 07/14/16. She returns today for follow up.    On 3/2/23, she underwent a shave biopsy of a "flesh colored soft compressible papule" at the site of discharge. The report showed lymphangiomatous proliferation, present at margins, with a note stating that if there is a history of radiation to the site then the lesion is best regarded as an atypical post radiation vascular proliferation and complete removal was recommended.  Pathology slide review at [facility] was performed with additional immunostains which showed that the lesional endothelial cells were positive for CD34, partially positive for D2-40, and negative for c-myc. Ki-67 showed a low mitotic index. These findings were consistent with atypical vascular lesion.   She underwent excision of the lesion on 5/18/23 with pathology showing skin with scar and scattered ectatic vessels. There was no multi-layering of the endothelium and no significant nuclear atypia. Immunostains showed ERG expression in all endothelial cells and D2-40 expression in ectatic vessels. Immunostain for c-myc was negative.   Subsequent FISH for MYC (8q24) on 3/3/23 specimen was negative, suggesting no evidence of MYC gene amplification.   Screening mammography and breast ultrasound on 6/28/23 showed no evidence of malignancy.

## 2024-04-04 NOTE — PHYSICAL EXAM
[Sclera] : the sclera and conjunctiva were normal [] : no respiratory distress [Heart Rate And Rhythm] : heart rate and rhythm were normal [Abdomen Soft] : soft [Nondistended] : nondistended [Axillary Lymph Nodes Enlarged Bilaterally] : axillary [Normal] : oriented to person, place and time, the affect was normal, the mood was normal and not anxious [de-identified] : well-healed surgical scars on upper and inferior breast, approx 1 cm scar at inferior breast is well healed, there is no rash, skin lesion or soft tissue nodule

## 2024-04-04 NOTE — HISTORY OF PRESENT ILLNESS
[FreeTextEntry1] : Ms. Shay is a 67-year-old woman with a history of stage IA [id number] moderately differentiated ductal carcinoma of the left breast ER 95% positive and OR 95% positive. She underwent a lumpectomy with negative margins and had a negative sentinel node biopsy. Adjuvant radiation therapy with a dose of 4240 cGy was given and completed on 07/14/16. She returns today for follow up.    On 3/2/23, she underwent a shave biopsy of a "flesh colored soft compressible papule" at the site of discharge. The report showed lymphangiomatous proliferation, present at margins, with a note stating that if there is a history of radiation to the site then the lesion is best regarded as an atypical post radiation vascular proliferation and complete removal was recommended.  Pathology slide review at [facility] was performed with additional immunostains which showed that the lesional endothelial cells were positive for CD34, partially positive for D2-40, and negative for c-myc. Ki-67 showed a low mitotic index. These findings were consistent with atypical vascular lesion.   She underwent excision of the lesion on 5/18/23 with pathology showing skin with scar and scattered ectatic vessels. There was no multi-layering of the endothelium and no significant nuclear atypia. Immunostains showed ERG expression in all endothelial cells and D2-40 expression in ectatic vessels. Immunostain for c-myc was negative.   Subsequent FISH for MYC (8q24) on 3/3/23 specimen was negative, suggesting no evidence of MYC gene amplification.   Screening mammography and breast ultrasound on 6/28/23 showed no evidence of malignancy.

## 2024-04-08 ENCOUNTER — APPOINTMENT (OUTPATIENT)
Dept: RADIATION ONCOLOGY | Facility: CLINIC | Age: 69
End: 2024-04-08

## 2024-04-08 ENCOUNTER — NON-APPOINTMENT (OUTPATIENT)
Age: 69
End: 2024-04-08

## 2024-04-11 ENCOUNTER — APPOINTMENT (OUTPATIENT)
Dept: GASTROENTEROLOGY | Facility: CLINIC | Age: 69
End: 2024-04-11
Payer: MEDICARE

## 2024-04-11 VITALS
HEIGHT: 59 IN | OXYGEN SATURATION: 98 % | TEMPERATURE: 97.8 F | BODY MASS INDEX: 27.01 KG/M2 | RESPIRATION RATE: 16 BRPM | WEIGHT: 134 LBS | DIASTOLIC BLOOD PRESSURE: 82 MMHG | SYSTOLIC BLOOD PRESSURE: 128 MMHG | HEART RATE: 86 BPM

## 2024-04-11 DIAGNOSIS — K57.90 DIVERTICULOSIS OF INTESTINE, PART UNSPECIFIED, W/OUT PERFORATION OR ABSCESS W/OUT BLEEDING: ICD-10-CM

## 2024-04-11 DIAGNOSIS — K59.00 CONSTIPATION, UNSPECIFIED: ICD-10-CM

## 2024-04-11 DIAGNOSIS — Z86.39 PERSONAL HISTORY OF OTHER ENDOCRINE, NUTRITIONAL AND METABOLIC DISEASE: ICD-10-CM

## 2024-04-11 DIAGNOSIS — R14.0 ABDOMINAL DISTENSION (GASEOUS): ICD-10-CM

## 2024-04-11 DIAGNOSIS — Z80.0 ENCOUNTER FOR SCREENING FOR MALIGNANT NEOPLASM OF COLON: ICD-10-CM

## 2024-04-11 DIAGNOSIS — Z85.3 PERSONAL HISTORY OF MALIGNANT NEOPLASM OF BREAST: ICD-10-CM

## 2024-04-11 DIAGNOSIS — R10.32 LEFT LOWER QUADRANT PAIN: ICD-10-CM

## 2024-04-11 DIAGNOSIS — Z12.11 ENCOUNTER FOR SCREENING FOR MALIGNANT NEOPLASM OF COLON: ICD-10-CM

## 2024-04-11 PROCEDURE — 99204 OFFICE O/P NEW MOD 45 MIN: CPT

## 2024-04-11 NOTE — CONSULT LETTER
[Dear  ___] : Dear  [unfilled], [Consult Letter:] : I had the pleasure of evaluating your patient, [unfilled]. [( Thank you for referring [unfilled] for consultation for _____ )] : Thank you for referring [unfilled] for consultation for [unfilled] [Please see my note below.] : Please see my note below. [Consult Closing:] : Thank you very much for allowing me to participate in the care of this patient.  If you have any questions, please do not hesitate to contact me. [FreeTextEntry3] : Kolyb Colindres MD  Gastroenterology [facility]

## 2024-04-11 NOTE — ASSESSMENT
[FreeTextEntry1] : Take with the magnesium metoprolol asked to the blood bowel movement close decompressing the colon I recommend that she have a colonoscopy to rule out a colonic lesion, polyp or stricture  MAMI MEDINA was advised to undergo colonoscopy to which she agreed. The procedure will be performed in [facility] with the assistance of an anesthesiologist. The patient was given a Miralax preparation prescription and understood the  procedure as it was explained to her. She was given a booklet distributed by the [employer] G[employer]  [employer] explaining the procedure in detail and she understood the risks of the procedure not limited to infection, bleeding, perforation or non- diagnosis of colorectal cancer. She was advised that she could not drive home, if she chooses to  receive sedation.  Further diagnostic and treatment recommendations will be based upon the procedure and any biopsies, if they are taken.  Thank you for allowing me to participate in this [facility] health care.  , Best personal regards -- Don   I spent 47 minutes with the patient and  her  and answered all of her questions

## 2024-04-11 NOTE — HISTORY OF PRESENT ILLNESS
[FreeTextEntry1] : She is a 68-year-old female with recent onset of worsening constipation associated with left lower quadrant abdominal pain. and bloating.  She denies fever,  chills or rectal bleeding.  She had a bowel movement this morning with some  relief in her symptoms.  She does not have any abdominal pain now.  She has a long history of irregular bowel habits. She had a colonoscopy 6 years ago which revealed diverticular disease.  She has a family history of colon cancer, specifically her grandfather.  She also has a past history of breast cancer.  Her  was in the room

## 2024-04-11 NOTE — PHYSICAL EXAM
[Normal] : normal bowel sounds, non-tender, no masses, soft, no no hepato-splenomegaly [Abdomen Tenderness] : non-tender

## 2024-04-13 ENCOUNTER — EMERGENCY (EMERGENCY)
Facility: HOSPITAL | Age: 69
LOS: 1 days | Discharge: HOME | End: 2024-04-13
Attending: STUDENT IN AN ORGANIZED HEALTH CARE EDUCATION/TRAINING PROGRAM | Admitting: STUDENT IN AN ORGANIZED HEALTH CARE EDUCATION/TRAINING PROGRAM
Payer: MEDICARE

## 2024-04-13 VITALS
RESPIRATION RATE: 18 BRPM | HEART RATE: 94 BPM | SYSTOLIC BLOOD PRESSURE: 146 MMHG | OXYGEN SATURATION: 98 % | DIASTOLIC BLOOD PRESSURE: 86 MMHG

## 2024-04-13 VITALS
SYSTOLIC BLOOD PRESSURE: 165 MMHG | HEART RATE: 97 BPM | TEMPERATURE: 98 F | DIASTOLIC BLOOD PRESSURE: 114 MMHG | RESPIRATION RATE: 18 BRPM | OXYGEN SATURATION: 97 %

## 2024-04-13 DIAGNOSIS — Z98.89 OTHER SPECIFIED POSTPROCEDURAL STATES: Chronic | ICD-10-CM

## 2024-04-13 LAB
ALBUMIN SERPL ELPH-MCNC: 4.1 G/DL — SIGNIFICANT CHANGE UP (ref 3.3–5)
ALP SERPL-CCNC: 97 U/L — SIGNIFICANT CHANGE UP (ref 40–120)
ALT FLD-CCNC: 27 U/L — SIGNIFICANT CHANGE UP (ref 4–33)
ANION GAP SERPL CALC-SCNC: 14 MMOL/L — SIGNIFICANT CHANGE UP (ref 7–14)
APPEARANCE UR: CLEAR — SIGNIFICANT CHANGE UP
AST SERPL-CCNC: 15 U/L — SIGNIFICANT CHANGE UP (ref 4–32)
BACTERIA # UR AUTO: ABNORMAL /HPF
BASE EXCESS BLDV CALC-SCNC: 0.7 MMOL/L — SIGNIFICANT CHANGE UP (ref -2–3)
BASOPHILS # BLD AUTO: 0.05 K/UL — SIGNIFICANT CHANGE UP (ref 0–0.2)
BASOPHILS NFR BLD AUTO: 0.4 % — SIGNIFICANT CHANGE UP (ref 0–2)
BILIRUB SERPL-MCNC: 0.3 MG/DL — SIGNIFICANT CHANGE UP (ref 0.2–1.2)
BILIRUB UR-MCNC: ABNORMAL
BLOOD GAS VENOUS COMPREHENSIVE RESULT: SIGNIFICANT CHANGE UP
BUN SERPL-MCNC: 20 MG/DL — SIGNIFICANT CHANGE UP (ref 7–23)
CALCIUM SERPL-MCNC: 9.4 MG/DL — SIGNIFICANT CHANGE UP (ref 8.4–10.5)
CHLORIDE BLDV-SCNC: 103 MMOL/L — SIGNIFICANT CHANGE UP (ref 96–108)
CHLORIDE SERPL-SCNC: 103 MMOL/L — SIGNIFICANT CHANGE UP (ref 98–107)
CO2 BLDV-SCNC: 26.6 MMOL/L — HIGH (ref 22–26)
CO2 SERPL-SCNC: 22 MMOL/L — SIGNIFICANT CHANGE UP (ref 22–31)
COLOR SPEC: YELLOW — SIGNIFICANT CHANGE UP
COMMENT - URINE: SIGNIFICANT CHANGE UP
CREAT SERPL-MCNC: 0.7 MG/DL — SIGNIFICANT CHANGE UP (ref 0.5–1.3)
DIFF PNL FLD: NEGATIVE — SIGNIFICANT CHANGE UP
EGFR: 94 ML/MIN/1.73M2 — SIGNIFICANT CHANGE UP
EOSINOPHIL # BLD AUTO: 0.02 K/UL — SIGNIFICANT CHANGE UP (ref 0–0.5)
EOSINOPHIL NFR BLD AUTO: 0.2 % — SIGNIFICANT CHANGE UP (ref 0–6)
EPI CELLS # UR: PRESENT
GAS PNL BLDV: 136 MMOL/L — SIGNIFICANT CHANGE UP (ref 136–145)
GLUCOSE BLDV-MCNC: 106 MG/DL — HIGH (ref 70–99)
GLUCOSE SERPL-MCNC: 112 MG/DL — HIGH (ref 70–99)
GLUCOSE UR QL: NEGATIVE MG/DL — SIGNIFICANT CHANGE UP
HCO3 BLDV-SCNC: 25 MMOL/L — SIGNIFICANT CHANGE UP (ref 22–29)
HCT VFR BLD CALC: 41.3 % — SIGNIFICANT CHANGE UP (ref 34.5–45)
HCT VFR BLDA CALC: 40 % — SIGNIFICANT CHANGE UP (ref 34.5–46.5)
HGB BLD CALC-MCNC: 13.4 G/DL — SIGNIFICANT CHANGE UP (ref 11.7–16.1)
HGB BLD-MCNC: 13.4 G/DL — SIGNIFICANT CHANGE UP (ref 11.5–15.5)
IANC: 10.01 K/UL — HIGH (ref 1.8–7.4)
IMM GRANULOCYTES NFR BLD AUTO: 0.3 % — SIGNIFICANT CHANGE UP (ref 0–0.9)
KETONES UR-MCNC: 40 MG/DL
LACTATE BLDV-MCNC: 1.1 MMOL/L — SIGNIFICANT CHANGE UP (ref 0.5–2)
LEUKOCYTE ESTERASE UR-ACNC: NEGATIVE — SIGNIFICANT CHANGE UP
LIDOCAIN IGE QN: 28 U/L — SIGNIFICANT CHANGE UP (ref 7–60)
LYMPHOCYTES # BLD AUTO: 1.17 K/UL — SIGNIFICANT CHANGE UP (ref 1–3.3)
LYMPHOCYTES # BLD AUTO: 9.9 % — LOW (ref 13–44)
MCHC RBC-ENTMCNC: 27.7 PG — SIGNIFICANT CHANGE UP (ref 27–34)
MCHC RBC-ENTMCNC: 32.4 GM/DL — SIGNIFICANT CHANGE UP (ref 32–36)
MCV RBC AUTO: 85.3 FL — SIGNIFICANT CHANGE UP (ref 80–100)
MONOCYTES # BLD AUTO: 0.5 K/UL — SIGNIFICANT CHANGE UP (ref 0–0.9)
MONOCYTES NFR BLD AUTO: 4.2 % — SIGNIFICANT CHANGE UP (ref 2–14)
NEUTROPHILS # BLD AUTO: 10.01 K/UL — HIGH (ref 1.8–7.4)
NEUTROPHILS NFR BLD AUTO: 85 % — HIGH (ref 43–77)
NITRITE UR-MCNC: NEGATIVE — SIGNIFICANT CHANGE UP
NRBC # BLD: 0 /100 WBCS — SIGNIFICANT CHANGE UP (ref 0–0)
NRBC # FLD: 0 K/UL — SIGNIFICANT CHANGE UP (ref 0–0)
PCO2 BLDV: 40 MMHG — SIGNIFICANT CHANGE UP (ref 39–52)
PH BLDV: 7.41 — SIGNIFICANT CHANGE UP (ref 7.32–7.43)
PH UR: 6 — SIGNIFICANT CHANGE UP (ref 5–8)
PLATELET # BLD AUTO: 299 K/UL — SIGNIFICANT CHANGE UP (ref 150–400)
PO2 BLDV: 50 MMHG — HIGH (ref 25–45)
POTASSIUM BLDV-SCNC: 3.8 MMOL/L — SIGNIFICANT CHANGE UP (ref 3.5–5.1)
POTASSIUM SERPL-MCNC: 4.1 MMOL/L — SIGNIFICANT CHANGE UP (ref 3.5–5.3)
POTASSIUM SERPL-SCNC: 4.1 MMOL/L — SIGNIFICANT CHANGE UP (ref 3.5–5.3)
PROT SERPL-MCNC: 7.4 G/DL — SIGNIFICANT CHANGE UP (ref 6–8.3)
PROT UR-MCNC: 100 MG/DL
RBC # BLD: 4.84 M/UL — SIGNIFICANT CHANGE UP (ref 3.8–5.2)
RBC # FLD: 12.7 % — SIGNIFICANT CHANGE UP (ref 10.3–14.5)
RBC CASTS # UR COMP ASSIST: 1 /HPF — SIGNIFICANT CHANGE UP (ref 0–4)
SAO2 % BLDV: 83.6 % — SIGNIFICANT CHANGE UP (ref 67–88)
SODIUM SERPL-SCNC: 139 MMOL/L — SIGNIFICANT CHANGE UP (ref 135–145)
SP GR SPEC: 1.04 — HIGH (ref 1–1.03)
TSH SERPL-MCNC: 1.41 UIU/ML — SIGNIFICANT CHANGE UP (ref 0.27–4.2)
UROBILINOGEN FLD QL: 0.2 MG/DL — SIGNIFICANT CHANGE UP (ref 0.2–1)
WBC # BLD: 11.78 K/UL — HIGH (ref 3.8–10.5)
WBC # FLD AUTO: 11.78 K/UL — HIGH (ref 3.8–10.5)
WBC UR QL: 2 /HPF — SIGNIFICANT CHANGE UP (ref 0–5)

## 2024-04-13 PROCEDURE — 93010 ELECTROCARDIOGRAM REPORT: CPT

## 2024-04-13 PROCEDURE — 99285 EMERGENCY DEPT VISIT HI MDM: CPT

## 2024-04-13 PROCEDURE — 74177 CT ABD & PELVIS W/CONTRAST: CPT | Mod: 26,MC

## 2024-04-13 RX ORDER — METRONIDAZOLE 500 MG
1 TABLET ORAL
Qty: 21 | Refills: 0
Start: 2024-04-13 | End: 2024-04-19

## 2024-04-13 RX ORDER — CIPROFLOXACIN LACTATE 400MG/40ML
1 VIAL (ML) INTRAVENOUS
Qty: 14 | Refills: 0
Start: 2024-04-13 | End: 2024-04-19

## 2024-04-13 RX ADMIN — Medication 1 TABLET(S): at 12:03

## 2024-04-13 NOTE — ED POST DISCHARGE NOTE - REASON FOR FOLLOW-UP
Other received call from pts  asking to switch meds as pt developed stomach upset after Augmentin, switched meds to cipro/flagyl, instructed  to bring pt back to ED for worsening symptoms and/or if not able to tolerate PO. reported understanding.

## 2024-04-13 NOTE — ED ADULT NURSE NOTE - CHIEF COMPLAINT QUOTE
Pt arrives ambulatory to triage c/o lower abdominal pain with nausea x5 days. Taking milk of magnesia for constipation, had small BM this AM. Saw GI  recently who ordered pt to have a colonoscopy. PMHx: breast ca (remission).

## 2024-04-13 NOTE — ED ADULT NURSE NOTE - OBJECTIVE STATEMENT
Received patient in Intake 1 c/o lower abdominal pain and nausea. PMHX Breast cancer. Patient denies SOB chest pain, fever. Patient is A&Ox4, airway patent, breathing unlabored and even, radial pulses palpable, abdomen soft, tender. Labs obtained, 20G IV placed right arm, awaiting CT scan. Side rails up and safety maintained. Call bells within reach. Family at the bedside.

## 2024-04-13 NOTE — ED PROVIDER NOTE - IV ALTEPLASE INCLUSION HIDDEN
"show

## 2024-04-13 NOTE — ED PROVIDER NOTE - PHYSICAL EXAMINATION
Lucia SENIOR:  VITALS: Initial triage and subsequent vitals have been reviewed by me.  GEN APPEARANCE: Alert, cooperative. Non-toxic appearing. Well appearing. NAD.  HEAD: Atraumatic, normocephalic   EYES: PERRLa, EOMI, vision grossly intact.   EARS: Gross hearing intact.   NOSE: No nasal discharge, no external evidence of epistaxis.   NECK: Supple  CV: RRR, S1S2, no c/r/m/g. No cyanosis. Extremities warm, well perfused. Cap refill <2 seconds. No bruits.   LUNGS: CTAB. No wheezing. No rales. No rhonchi. No diminished breath sounds.   ABDOMEN: diffuse lower abdominal tenderness   MSK/EXT: Spine appears normal, no spine point tenderness. No CVA ttp. Normal muscular development. Pelvis stable. No obvious joint or bony deformity, no peripheral edema.   NEURO: Alert, follows commands. Weight bearing normal. Speech normal. Sensation and motor normal x4 extremities.   SKIN: Normal color for race, warm, dry and intact. No evidence of rash.  PSYCH: Normal mood and affect.

## 2024-04-13 NOTE — ED PROVIDER NOTE - NSFOLLOWUPINSTRUCTIONS_ED_ALL_ED_FT
Thank you for visiting our Emergency Department, it has been a pleasure taking part in your healthcare.  Please read and follow all of your discharge instructions. These instructions contain important information regarding your Emergency Department visit and future medical care.     Your discharge diagnosis is: sigmoid diverticulitis  Please take all discharge medications as indicated below:  Take Motrin/Tylenol for pain as needed, please follow instructions on manufacturers label. If you have any questions please consult a pharmacist or your PMD.  Please follow up with your Primary Care Doctor (PMD) within x48 hours.  Bring and show your PMD all documents and results you were given during your ED visit.  If you do not have a primary care doctor please call [phone] to establish primary care.  A copy of resulted labs, imaging, and findings have been provided to you.   You can also access all of your results through the [facility].  If you have questions about your results, please call the Emergency Department.  During your visit and at time of discharge, you had a detailed discussion with your provider regarding your diagnosis, care management and discharge planning.  Topics that were discussed included but were not limited to: return precautions, follow up visits with existing or new providers, new prescriptions and/or medication changes, wound and/or splint/cast care, incidental laboratory/radiology findings, or other care   aspects specific to your diagnosis and treatment. You have been given the opportunity to have your questions answered. At this time you have been deemed stable and fit for discharge.  Return precautions to the Emergency Department include but are not limited to: unrelenting nausea, vomiting, fever, chills, chest pain, shortness of breath, dizziness, chest or abdominal pain, worsening back pain, syncope, blood in urine or stool, headache that doesn't resolve, numbness or tingling, loss of sensation, loss of motor function, or any other concerning symptoms.    Please bring all ED Documents you were given during your stay to your PMD.   They contain important information for you and your PMD, including incidental lab/radiology findings that your PMD should be aware of.  Please follow up with your Gastroenterologist (GI) within x48 hours.  A list of providers for follow up has been given to you.

## 2024-04-13 NOTE — ED PROVIDER NOTE - OBJECTIVE STATEMENT
Lucia SENIOR: 68-year-old female history of breast cancer in remission and prior C-sections no other abdominal surgical history presents with a chief complaint of diffuse lower abdominal pain intermittent over the last 5 days associated with radiation to the back, having intermittent episodes and is with constipation no bloody bowel movements had a small bowel movement today, no melanotic stools is nauseous but not vomiting no fever no rashes no urinary complaints no unusual vaginal bleeding or discharge no chest pain no trouble breathing no fever.  Recently discussed her symptoms with a GI doctor who prescribed her stool softeners and she is scheduled for colonoscopy in May. Pt reports she has not eaten much over last few days 2/2 her abdominal discomfort, her  believes anxiety may be a component of her symptoms.

## 2024-04-13 NOTE — ED PROVIDER NOTE - PATIENT PORTAL LINK FT
You can access the FollowMyHealth Patient Portal offered by [facility] by registering at the following website: [email or web address]. By joining [employer]’s FollowMyHealth portal, you will also be able to view your health information using other applications (apps) compatible with our system.

## 2024-04-13 NOTE — ED PROVIDER NOTE - CLINICAL SUMMARY MEDICAL DECISION MAKING FREE TEXT BOX
Lucia SENIOR: Exam vitals are stable nontoxic-appearing physical exam as above, DDx patient is lower abdominal tenderness on exam concern for possible colitis consider possible appendicitis urinary tract infection though would be unusual with no symptoms no fever, less concern for primary pelvic pathology as no discharge or bleeding, consider possible underdiagnosed IBS as patient has a family history of IBS and her daughter.  Consider possible recurrence of colon CA though not having any bloody stools less likely obstruction as patient has no significant abdominal surgical history.  Given overall constellation we will check basic labs thyroid CT abdomen pelvis will give meds as needed and reassess dispo pending.

## 2024-04-13 NOTE — ED PROVIDER NOTE - PROGRESS NOTE DETAILS
Lucia SENIOR: Pt assessed at beside. Pt resting comfortably, pain controlled, pt questions answered. Vital signs stable. discussed ct findings w pt, less c/f abscess at this time, pt would like to go home pt has GI to follow up with. Strict return precautions were discussed. Pt expressed understanding.

## 2024-04-14 LAB
CULTURE RESULTS: SIGNIFICANT CHANGE UP
SPECIMEN SOURCE: SIGNIFICANT CHANGE UP

## 2024-04-22 ENCOUNTER — NON-APPOINTMENT (OUTPATIENT)
Age: 69
End: 2024-04-22

## 2024-05-01 ENCOUNTER — EMERGENCY (EMERGENCY)
Facility: HOSPITAL | Age: 69
LOS: 1 days | Discharge: HOME | End: 2024-05-01
Attending: EMERGENCY MEDICINE | Admitting: EMERGENCY MEDICINE
Payer: MEDICARE

## 2024-05-01 ENCOUNTER — APPOINTMENT (OUTPATIENT)
Dept: GASTROENTEROLOGY | Facility: AMBULATORY SURGERY CENTER | Age: 69
End: 2024-05-01

## 2024-05-01 VITALS
TEMPERATURE: 98 F | DIASTOLIC BLOOD PRESSURE: 84 MMHG | OXYGEN SATURATION: 100 % | RESPIRATION RATE: 15 BRPM | SYSTOLIC BLOOD PRESSURE: 154 MMHG | HEART RATE: 90 BPM

## 2024-05-01 VITALS
SYSTOLIC BLOOD PRESSURE: 150 MMHG | HEART RATE: 82 BPM | TEMPERATURE: 98 F | RESPIRATION RATE: 16 BRPM | DIASTOLIC BLOOD PRESSURE: 83 MMHG | OXYGEN SATURATION: 100 %

## 2024-05-01 DIAGNOSIS — Z98.89 OTHER SPECIFIED POSTPROCEDURAL STATES: Chronic | ICD-10-CM

## 2024-05-01 LAB
ALBUMIN SERPL ELPH-MCNC: 4 G/DL — SIGNIFICANT CHANGE UP (ref 3.3–5)
ALP SERPL-CCNC: 62 U/L — SIGNIFICANT CHANGE UP (ref 40–120)
ALT FLD-CCNC: 16 U/L — SIGNIFICANT CHANGE UP (ref 4–33)
ANION GAP SERPL CALC-SCNC: 12 MMOL/L — SIGNIFICANT CHANGE UP (ref 7–14)
APPEARANCE UR: ABNORMAL
AST SERPL-CCNC: 17 U/L — SIGNIFICANT CHANGE UP (ref 4–32)
BACTERIA # UR AUTO: NEGATIVE /HPF — SIGNIFICANT CHANGE UP
BASOPHILS # BLD AUTO: 0.03 K/UL — SIGNIFICANT CHANGE UP (ref 0–0.2)
BASOPHILS NFR BLD AUTO: 0.3 % — SIGNIFICANT CHANGE UP (ref 0–2)
BILIRUB SERPL-MCNC: 0.3 MG/DL — SIGNIFICANT CHANGE UP (ref 0.2–1.2)
BILIRUB UR-MCNC: NEGATIVE — SIGNIFICANT CHANGE UP
BUN SERPL-MCNC: 12 MG/DL — SIGNIFICANT CHANGE UP (ref 7–23)
CALCIUM SERPL-MCNC: 8.9 MG/DL — SIGNIFICANT CHANGE UP (ref 8.4–10.5)
CAST: 1 /LPF — SIGNIFICANT CHANGE UP (ref 0–4)
CHLORIDE SERPL-SCNC: 103 MMOL/L — SIGNIFICANT CHANGE UP (ref 98–107)
CO2 SERPL-SCNC: 24 MMOL/L — SIGNIFICANT CHANGE UP (ref 22–31)
COD CRY URNS QL: PRESENT
COLOR SPEC: SIGNIFICANT CHANGE UP
CREAT SERPL-MCNC: 0.72 MG/DL — SIGNIFICANT CHANGE UP (ref 0.5–1.3)
DIFF PNL FLD: NEGATIVE — SIGNIFICANT CHANGE UP
EGFR: 91 ML/MIN/1.73M2 — SIGNIFICANT CHANGE UP
EOSINOPHIL # BLD AUTO: 0.08 K/UL — SIGNIFICANT CHANGE UP (ref 0–0.5)
EOSINOPHIL NFR BLD AUTO: 0.9 % — SIGNIFICANT CHANGE UP (ref 0–6)
GLUCOSE SERPL-MCNC: 116 MG/DL — HIGH (ref 70–99)
GLUCOSE UR QL: NEGATIVE MG/DL — SIGNIFICANT CHANGE UP
HCT VFR BLD CALC: 42.8 % — SIGNIFICANT CHANGE UP (ref 34.5–45)
HGB BLD-MCNC: 13.9 G/DL — SIGNIFICANT CHANGE UP (ref 11.5–15.5)
IANC: 7.53 K/UL — HIGH (ref 1.8–7.4)
IMM GRANULOCYTES NFR BLD AUTO: 0.5 % — SIGNIFICANT CHANGE UP (ref 0–0.9)
KETONES UR-MCNC: ABNORMAL MG/DL
LEUKOCYTE ESTERASE UR-ACNC: ABNORMAL
LIDOCAIN IGE QN: 32 U/L — SIGNIFICANT CHANGE UP (ref 7–60)
LYMPHOCYTES # BLD AUTO: 0.77 K/UL — LOW (ref 1–3.3)
LYMPHOCYTES # BLD AUTO: 8.8 % — LOW (ref 13–44)
MCHC RBC-ENTMCNC: 27.9 PG — SIGNIFICANT CHANGE UP (ref 27–34)
MCHC RBC-ENTMCNC: 32.5 GM/DL — SIGNIFICANT CHANGE UP (ref 32–36)
MCV RBC AUTO: 85.8 FL — SIGNIFICANT CHANGE UP (ref 80–100)
MONOCYTES # BLD AUTO: 0.34 K/UL — SIGNIFICANT CHANGE UP (ref 0–0.9)
MONOCYTES NFR BLD AUTO: 3.9 % — SIGNIFICANT CHANGE UP (ref 2–14)
NEUTROPHILS # BLD AUTO: 7.53 K/UL — HIGH (ref 1.8–7.4)
NEUTROPHILS NFR BLD AUTO: 85.6 % — HIGH (ref 43–77)
NITRITE UR-MCNC: NEGATIVE — SIGNIFICANT CHANGE UP
NRBC # BLD: 0 /100 WBCS — SIGNIFICANT CHANGE UP (ref 0–0)
NRBC # FLD: 0 K/UL — SIGNIFICANT CHANGE UP (ref 0–0)
PH UR: 5.5 — SIGNIFICANT CHANGE UP (ref 5–8)
PLATELET # BLD AUTO: 341 K/UL — SIGNIFICANT CHANGE UP (ref 150–400)
POTASSIUM SERPL-MCNC: 3.9 MMOL/L — SIGNIFICANT CHANGE UP (ref 3.5–5.3)
POTASSIUM SERPL-SCNC: 3.9 MMOL/L — SIGNIFICANT CHANGE UP (ref 3.5–5.3)
PROT SERPL-MCNC: 7.2 G/DL — SIGNIFICANT CHANGE UP (ref 6–8.3)
PROT UR-MCNC: SIGNIFICANT CHANGE UP MG/DL
RBC # BLD: 4.99 M/UL — SIGNIFICANT CHANGE UP (ref 3.8–5.2)
RBC # FLD: 13.2 % — SIGNIFICANT CHANGE UP (ref 10.3–14.5)
RBC CASTS # UR COMP ASSIST: 3 /HPF — SIGNIFICANT CHANGE UP (ref 0–4)
REVIEW: SIGNIFICANT CHANGE UP
SODIUM SERPL-SCNC: 139 MMOL/L — SIGNIFICANT CHANGE UP (ref 135–145)
SP GR SPEC: 1.02 — SIGNIFICANT CHANGE UP (ref 1–1.03)
SQUAMOUS # UR AUTO: 2 /HPF — SIGNIFICANT CHANGE UP (ref 0–5)
UROBILINOGEN FLD QL: 1 MG/DL — SIGNIFICANT CHANGE UP (ref 0.2–1)
WBC # BLD: 8.79 K/UL — SIGNIFICANT CHANGE UP (ref 3.8–10.5)
WBC # FLD AUTO: 8.79 K/UL — SIGNIFICANT CHANGE UP (ref 3.8–10.5)
WBC UR QL: 8 /HPF — HIGH (ref 0–5)

## 2024-05-01 PROCEDURE — 99285 EMERGENCY DEPT VISIT HI MDM: CPT

## 2024-05-01 PROCEDURE — 74177 CT ABD & PELVIS W/CONTRAST: CPT | Mod: 26,MC

## 2024-05-01 RX ORDER — SODIUM CHLORIDE 9 MG/ML
1000 INJECTION INTRAMUSCULAR; INTRAVENOUS; SUBCUTANEOUS ONCE
Refills: 0 | Status: COMPLETED | OUTPATIENT
Start: 2024-05-01 | End: 2024-05-01

## 2024-05-01 RX ORDER — KETOROLAC TROMETHAMINE 30 MG/ML
15 SYRINGE (ML) INJECTION ONCE
Refills: 0 | Status: DISCONTINUED | OUTPATIENT
Start: 2024-05-01 | End: 2024-05-01

## 2024-05-01 RX ADMIN — SODIUM CHLORIDE 1000 MILLILITER(S): 9 INJECTION INTRAMUSCULAR; INTRAVENOUS; SUBCUTANEOUS at 08:45

## 2024-05-01 RX ADMIN — Medication 15 MILLIGRAM(S): at 09:51

## 2024-05-01 RX ADMIN — Medication 15 MILLIGRAM(S): at 08:45

## 2024-05-01 NOTE — ED PROVIDER NOTE - HIV OFFER
Caller: Soni Zarate    Relationship: Self    Best call back number: [phone]    Who are you requesting to speak with (clinical staff, DR. DOW       What was the call regarding: PATIENT ADVISED THAT SHE NEEDS TO RESCHEDULE HER 37 WK CHECK UP ON 4/29/24 DUE TO WORK,  HUB HAD NO AVAILABILITY THAT WEEK.  PATIENT WOULD LIKE A CALL WITH NEXT APPT DATE/TIME    
Previously Declined (within the last year)

## 2024-05-01 NOTE — ED ADULT TRIAGE NOTE - CHIEF COMPLAINT QUOTE
Pt c/o intermittent c/o abdominal pain x 3 weeks. Pt seen on 3/13 ct scan showed diverticulitis. Endorses diarrhea, chills. Denies n/v, fever. Past medical History : Diverticulitis, Bipolar

## 2024-05-01 NOTE — ED PROVIDER NOTE - PATIENT PORTAL LINK FT
You can access the FollowMyHealth Patient Portal offered by [facility] by registering at the following website: [email or web address]. By joining [employer]’s FollowMyHealth portal, you will also be able to view your health information using other applications (apps) compatible with our system.

## 2024-05-01 NOTE — ED ADULT NURSE NOTE - NSFALLUNIVINTERV_ED_ALL_ED
Bed/Stretcher in lowest position, wheels locked, appropriate side rails in place/Call bell, personal items and telephone in reach/Instruct patient to call for assistance before getting out of bed/chair/stretcher/Non-slip footwear applied when patient is off stretcher/[place] to call system/Physically safe environment - no spills, clutter or unnecessary equipment/Purposeful proactive rounding/Room/bathroom lighting operational, light cord in reach

## 2024-05-01 NOTE — ED PROVIDER NOTE - OBJECTIVE STATEMENT
68-year-old female history of bipolar and hypertension, not on lithium, presents emergency department with persistent left lower quadrant pain.  Patient states she was treated for diverticulitis 3 weeks ago received almost 12 days of ciprofloxacin and Flagyl.Patient states that symptoms are intermittent and come and go.  Denies any chills nausea vomiting.

## 2024-05-01 NOTE — ED ADULT NURSE NOTE - OBJECTIVE STATEMENT
This is a 68 y/0 female complaining of intermittent LLQ pain 5/10. Alert and oriented x 4. Past medical history of diverticulitis, breast ca left, bipolar. Appear anxious, breathing is even and non labored, chest sounds clear bilaterally, abdomen is soft, bowel sounds x 4, tender on left lower quadrant by palpation. IV initiated on left AC blood work sent to lab due meds given. Seen by Dr Bernardo.

## 2024-05-01 NOTE — ED ADULT NURSE REASSESSMENT NOTE - NS ED NURSE REASSESS COMMENT FT1
Vital sign stable, blood work for Hep c sent to lab. Pain relieved from meds. Waiting for test results.

## 2024-05-01 NOTE — ED PROVIDER NOTE - NSFOLLOWUPINSTRUCTIONS_ED_ALL_ED_FT
Diverticulitis  Body outline showing the stomach and intestines, with a close-up of diverticula on the large intestine.  Diverticulitis happens when poop (stool) and bacteria get trapped in small pouches in the colon called diverticula. These pouches may form if you have a condition called diverticulosis. When the poop and bacteria get trapped, it can cause an infection and inflammation.    Diverticulitis may cause severe stomach pain and diarrhea. It can also lead to tissue damage in your colon. This can cause bleeding or blockage. In some cases, the diverticula may burst (rupture). This can cause infected poop to go into other parts of your abdomen.    What are the causes?  This condition is caused by poop getting trapped in the diverticula. This allows bacteria to grow. It can lead to inflammation and infection.    What increases the risk?  You are more likely to get this condition if you have diverticulosis. You are also more at risk if:  You are overweight or obese.  You do not get enough exercise.  You drink alcohol.  You smoke.  You eat a lot of red meat, such as beef, pork, or lamb.  You do not get enough fiber. Foods high in fiber include fruits, vegetables, beans, nuts, and whole grains.  You are over 40 years of age.  What are the signs or symptoms?  Symptoms of this condition may include:  Pain and tenderness in the abdomen. This pain is often felt on the left side but may occur in other spots.  Fever and chills.  Nausea and vomiting.  Cramping.  Bloating.  Changes in how often you poop.  Blood in your poop.  How is this diagnosed?  This condition is diagnosed based on your medical history and a physical exam. You may also have tests done to make sure there is nothing else causing your condition. These tests may include:  Blood tests.  Tests done on your pee (urine).  A CT scan of the abdomen.  You may need to have a colonoscopy. This is an exam to look at your whole large intestine. During the exam, a tube is put into the opening of your butt (anus) and then moved into your rectum, colon, and other parts of the large intestine.    This exam is done to look at the diverticula. It can also see if there is something else that may be causing your symptoms.    How is this treated?  Most cases are mild and can be treated at home. You may be told to:  Take over-the-counter pain medicine.  Only eat and drink clear liquids.  Take antibiotics.  Rest.  More severe cases may need to be treated at a hospital. Treatment may include:  Not eating or drinking.  Taking pain medicines.  Getting antibiotics through an IV.  Getting fluids and nutrition through an IV.  Surgery.  Follow these instructions at home:  Medicines    Take over-the-counter and prescription medicines only as told by your health care provider. These include fiber supplements, probiotics, and medicines to soften your poop (stool softeners).  If you were prescribed antibiotics, take them as told by your provider. Do not stop using the antibiotic even if you start to feel better.  Ask your provider if the medicine prescribed to you requires you to avoid driving or using machinery.  Eating and drinking    Pear, berries, artichoke, and beans.  Follow the diet told by your provider. You may need to only eat and drink liquids.  After your symptoms get better, you may be able to return to a more normal diet. You may be told to eat at least 25 grams (25 g) of fiber each day. Fiber makes it easier to poop. Healthy sources of fiber include:  Berries. One cup has 4–8 g of fiber.  Beans or lentils. One-half cup has 5–8 g of fiber.  Green vegetables. One cup has 4 g of fiber.  Avoid eating red meat.  General instructions    Do not use any products that contain nicotine or tobacco. These products include cigarettes, chewing tobacco, and vaping devices, such as e-cigarettes. If you need help quitting, ask your provider.  Exercise for at least 30 minutes, 3 times a week. Exercise hard enough to raise your heart rate and break a sweat.

## 2024-05-01 NOTE — ED PROVIDER NOTE - CLINICAL SUMMARY MEDICAL DECISION MAKING FREE TEXT BOX
pt p/w persistent llq although improved, with intermittent symptoms, labs wnl, ct neg, no diverticulitis. vss. will  f/u with gi. hd stable otherwise, afebrile.

## 2024-05-02 LAB
HCV AB S/CO SERPL IA: 0.1 S/CO — SIGNIFICANT CHANGE UP (ref 0–0.99)
HCV AB SERPL-IMP: SIGNIFICANT CHANGE UP

## 2024-05-20 ENCOUNTER — APPOINTMENT (OUTPATIENT)
Dept: RADIATION ONCOLOGY | Facility: CLINIC | Age: 69
End: 2024-05-20
Payer: MEDICARE

## 2024-05-20 VITALS
TEMPERATURE: 97.16 F | HEIGHT: 59 IN | SYSTOLIC BLOOD PRESSURE: 135 MMHG | DIASTOLIC BLOOD PRESSURE: 87 MMHG | WEIGHT: 134 LBS | RESPIRATION RATE: 17 BRPM | HEART RATE: 75 BPM | BODY MASS INDEX: 27.01 KG/M2 | OXYGEN SATURATION: 99 %

## 2024-05-20 DIAGNOSIS — C50.912 MALIGNANT NEOPLASM OF UNSPECIFIED SITE OF LEFT FEMALE BREAST: ICD-10-CM

## 2024-05-20 PROCEDURE — 99212 OFFICE O/P EST SF 10 MIN: CPT

## 2024-05-21 NOTE — HISTORY OF PRESENT ILLNESS
[FreeTextEntry1] : Ms. 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 She underwent a lumpectomy with negative margins and had a negative sentinel node biopsy. Adjuvant radiation therapy with a dose of 4240 cGy was given and completed on 07/14/16. She returns today for follow up.   She had banged her left lateral breast into a chair early January 2023 after which she had pain and some swelling, denies redness. On 3/2/23, she underwent a shave biopsy of a "flesh colored soft compressible papule" at the site of discharge. The report showed lymphangiomatous proliferation, present at margins, with a note stating that if there is a history of radiation to the site then the lesion is best regarded as an atypical post radiation vascular proliferation and complete removal was recommended.  Pathology slide review at [facility] was performed with additional immunostains which showed that the lesional endothelial cells were positive for CD34, partially positive for D2-40, and negative for c-myc. Ki-67 showed a low mitotic index. These findings were consistent with atypical vascular lesion.   She underwent excision of the lesion on 5/18/23 with pathology showing skin with scar and scattered ectatic vessels. There was no multi-layering of the endothelium and no significant nuclear atypia. Immunostains showed ERG expression in all endothelial cells and D2-40 expression in ectatic vessels. Immunostain for c-myc was negative. Subsequent FISH for MYC (8q24) on 3/3/23 specimen was negative, suggesting no evidence of MYC gene amplification.   Screening mammography and breast ultrasound on 6/28/23 showed no evidence of malignancy.  Recommendation- Mammography in 1 year. BI-RADS 2. Breast imaging is scheduled in July 2024.  Today, she reports having been recently hospitalized for abdominal pain from diverticulitis.  Symptoms resolved after anti-biotic therapy. She denies breast pain, changes or new skin lesions.

## 2024-05-21 NOTE — REVIEW OF SYSTEMS
[Negative] : Allergic/Immunologic [Alopecia: Grade 0] : Alopecia: Grade 0 [Pruritus: Grade 0] : Pruritus: Grade 0 [Skin Atrophy: Grade 0] : Skin Atrophy: Grade 0 [Skin Hyperpigmentation: Grade 0] : Skin Hyperpigmentation: Grade 0 [Skin Induration: Grade 0] : Skin Induration: Grade 0 [Dermatitis Radiation: Grade 0] : Dermatitis Radiation: Grade 0

## 2024-05-21 NOTE — PHYSICAL EXAM
[Sclera] : the sclera and conjunctiva were normal [Outer Ear] : the ears and nose were normal in appearance [] : no respiratory distress [No Focal Deficits] : no focal deficits [Normal] : oriented to person, place and time, the affect was normal, the mood was normal and not anxious [Axillary Lymph Nodes Enlarged Bilaterally] : axillary [de-identified] : well-healed surgical scars on upper and inferior breast, no rash, no skin lesions or soft tissue nodules

## 2024-05-24 ENCOUNTER — NON-APPOINTMENT (OUTPATIENT)
Age: 69
End: 2024-05-24

## 2024-05-27 ENCOUNTER — EMERGENCY (EMERGENCY)
Facility: HOSPITAL | Age: 69
LOS: 1 days | Discharge: HOME | End: 2024-05-27
Attending: EMERGENCY MEDICINE | Admitting: EMERGENCY MEDICINE
Payer: MEDICARE

## 2024-05-27 VITALS
SYSTOLIC BLOOD PRESSURE: 178 MMHG | RESPIRATION RATE: 16 BRPM | WEIGHT: 134.04 LBS | TEMPERATURE: 98 F | HEIGHT: 60 IN | HEART RATE: 73 BPM | OXYGEN SATURATION: 98 % | DIASTOLIC BLOOD PRESSURE: 98 MMHG

## 2024-05-27 DIAGNOSIS — Z98.89 OTHER SPECIFIED POSTPROCEDURAL STATES: Chronic | ICD-10-CM

## 2024-05-27 LAB
ALBUMIN SERPL ELPH-MCNC: 4 G/DL — SIGNIFICANT CHANGE UP (ref 3.3–5)
ALP SERPL-CCNC: 93 U/L — SIGNIFICANT CHANGE UP (ref 40–120)
ALT FLD-CCNC: 35 U/L — HIGH (ref 4–33)
ANION GAP SERPL CALC-SCNC: 13 MMOL/L — SIGNIFICANT CHANGE UP (ref 7–14)
APPEARANCE UR: CLEAR — SIGNIFICANT CHANGE UP
AST SERPL-CCNC: 20 U/L — SIGNIFICANT CHANGE UP (ref 4–32)
BASOPHILS # BLD AUTO: 0.03 K/UL — SIGNIFICANT CHANGE UP (ref 0–0.2)
BASOPHILS NFR BLD AUTO: 0.3 % — SIGNIFICANT CHANGE UP (ref 0–2)
BILIRUB SERPL-MCNC: 0.2 MG/DL — SIGNIFICANT CHANGE UP (ref 0.2–1.2)
BILIRUB UR-MCNC: NEGATIVE — SIGNIFICANT CHANGE UP
BUN SERPL-MCNC: 13 MG/DL — SIGNIFICANT CHANGE UP (ref 7–23)
CALCIUM SERPL-MCNC: 9.2 MG/DL — SIGNIFICANT CHANGE UP (ref 8.4–10.5)
CHLORIDE SERPL-SCNC: 105 MMOL/L — SIGNIFICANT CHANGE UP (ref 98–107)
CO2 SERPL-SCNC: 23 MMOL/L — SIGNIFICANT CHANGE UP (ref 22–31)
COLOR SPEC: YELLOW — SIGNIFICANT CHANGE UP
CREAT SERPL-MCNC: 0.68 MG/DL — SIGNIFICANT CHANGE UP (ref 0.5–1.3)
DIFF PNL FLD: NEGATIVE — SIGNIFICANT CHANGE UP
EGFR: 95 ML/MIN/1.73M2 — SIGNIFICANT CHANGE UP
EOSINOPHIL # BLD AUTO: 0.06 K/UL — SIGNIFICANT CHANGE UP (ref 0–0.5)
EOSINOPHIL NFR BLD AUTO: 0.5 % — SIGNIFICANT CHANGE UP (ref 0–6)
GLUCOSE SERPL-MCNC: 123 MG/DL — HIGH (ref 70–99)
GLUCOSE UR QL: NEGATIVE MG/DL — SIGNIFICANT CHANGE UP
HCT VFR BLD CALC: 40.2 % — SIGNIFICANT CHANGE UP (ref 34.5–45)
HGB BLD-MCNC: 13.2 G/DL — SIGNIFICANT CHANGE UP (ref 11.5–15.5)
IANC: 10.09 K/UL — HIGH (ref 1.8–7.4)
IMM GRANULOCYTES NFR BLD AUTO: 0.5 % — SIGNIFICANT CHANGE UP (ref 0–0.9)
KETONES UR-MCNC: NEGATIVE MG/DL — SIGNIFICANT CHANGE UP
LEUKOCYTE ESTERASE UR-ACNC: NEGATIVE — SIGNIFICANT CHANGE UP
LIDOCAIN IGE QN: 32 U/L — SIGNIFICANT CHANGE UP (ref 7–60)
LYMPHOCYTES # BLD AUTO: 1.35 K/UL — SIGNIFICANT CHANGE UP (ref 1–3.3)
LYMPHOCYTES # BLD AUTO: 11.3 % — LOW (ref 13–44)
MCHC RBC-ENTMCNC: 28.1 PG — SIGNIFICANT CHANGE UP (ref 27–34)
MCHC RBC-ENTMCNC: 32.8 GM/DL — SIGNIFICANT CHANGE UP (ref 32–36)
MCV RBC AUTO: 85.7 FL — SIGNIFICANT CHANGE UP (ref 80–100)
MONOCYTES # BLD AUTO: 0.4 K/UL — SIGNIFICANT CHANGE UP (ref 0–0.9)
MONOCYTES NFR BLD AUTO: 3.3 % — SIGNIFICANT CHANGE UP (ref 2–14)
NEUTROPHILS # BLD AUTO: 10.09 K/UL — HIGH (ref 1.8–7.4)
NEUTROPHILS NFR BLD AUTO: 84.1 % — HIGH (ref 43–77)
NITRITE UR-MCNC: NEGATIVE — SIGNIFICANT CHANGE UP
NRBC # BLD: 0 /100 WBCS — SIGNIFICANT CHANGE UP (ref 0–0)
NRBC # FLD: 0 K/UL — SIGNIFICANT CHANGE UP (ref 0–0)
PH UR: 5.5 — SIGNIFICANT CHANGE UP (ref 5–8)
PLATELET # BLD AUTO: 313 K/UL — SIGNIFICANT CHANGE UP (ref 150–400)
POTASSIUM SERPL-MCNC: 4 MMOL/L — SIGNIFICANT CHANGE UP (ref 3.5–5.3)
POTASSIUM SERPL-SCNC: 4 MMOL/L — SIGNIFICANT CHANGE UP (ref 3.5–5.3)
PROT SERPL-MCNC: 6.8 G/DL — SIGNIFICANT CHANGE UP (ref 6–8.3)
PROT UR-MCNC: NEGATIVE MG/DL — SIGNIFICANT CHANGE UP
RBC # BLD: 4.69 M/UL — SIGNIFICANT CHANGE UP (ref 3.8–5.2)
RBC # FLD: 13.2 % — SIGNIFICANT CHANGE UP (ref 10.3–14.5)
SODIUM SERPL-SCNC: 141 MMOL/L — SIGNIFICANT CHANGE UP (ref 135–145)
SP GR SPEC: 1.02 — SIGNIFICANT CHANGE UP (ref 1–1.03)
UROBILINOGEN FLD QL: 0.2 MG/DL — SIGNIFICANT CHANGE UP (ref 0.2–1)
WBC # BLD: 11.99 K/UL — HIGH (ref 3.8–10.5)
WBC # FLD AUTO: 11.99 K/UL — HIGH (ref 3.8–10.5)

## 2024-05-27 PROCEDURE — 99284 EMERGENCY DEPT VISIT MOD MDM: CPT

## 2024-05-27 PROCEDURE — 93010 ELECTROCARDIOGRAM REPORT: CPT

## 2024-05-27 RX ORDER — KETOROLAC TROMETHAMINE 30 MG/ML
15 SYRINGE (ML) INJECTION ONCE
Refills: 0 | Status: DISCONTINUED | OUTPATIENT
Start: 2024-05-27 | End: 2024-05-27

## 2024-05-27 RX ORDER — SODIUM CHLORIDE 9 MG/ML
1000 INJECTION INTRAMUSCULAR; INTRAVENOUS; SUBCUTANEOUS ONCE
Refills: 0 | Status: COMPLETED | OUTPATIENT
Start: 2024-05-27 | End: 2024-05-27

## 2024-05-27 RX ORDER — ONDANSETRON 8 MG/1
4 TABLET, FILM COATED ORAL ONCE
Refills: 0 | Status: COMPLETED | OUTPATIENT
Start: 2024-05-27 | End: 2024-05-27

## 2024-05-27 RX ADMIN — ONDANSETRON 4 MILLIGRAM(S): 8 TABLET, FILM COATED ORAL at 07:11

## 2024-05-27 RX ADMIN — Medication 15 MILLIGRAM(S): at 07:11

## 2024-05-27 RX ADMIN — SODIUM CHLORIDE 1000 MILLILITER(S): 9 INJECTION INTRAMUSCULAR; INTRAVENOUS; SUBCUTANEOUS at 07:11

## 2024-05-27 NOTE — ED PROVIDER NOTE - PROGRESS NOTE DETAILS
Radha Sweet PGY2: Pt endorsed to me at sign out. Pt reassessed and resting comfortably. Pt endorses improvement in pain after meds. Pending labs results. Radha Sweet PGY2: Pt reassessed and resting comfortably. Results discussed with pt. Pt okay for dc. DC instructions and return precautions discussed with patient. Questions answered. Pt to follow up with GI. Radha Sweet PGY2: Pt endorsed to me at sign out. Pt reassessed and resting comfortably. Abd soft, nondistended, nontender. Pt endorses improvement in pain after meds. Pending labs results.

## 2024-05-27 NOTE — ED PROVIDER NOTE - CLINICAL SUMMARY MEDICAL DECISION MAKING FREE TEXT BOX
68-year-old female with history of bipolar disorder, hypertension and previous diverticulitis that was diagnosed April 2024 that is now scheduled for colonoscopy to July 6 of this year.  Patient has a relatively benign exam and she really does not want a CT scan as she states that she is already had 2 in the last month and has had multiple in the past due to previous breast cancer diagnosis.  I explained to patient and  that since patient's abdomen is soft and nontender and she is tolerating p.o. we will obtain labs and provide medications as a CT may or may not change her management.  Unlikely perforated diverticulitis unlikely appendicitis that she has no right-sided pain.  Possible UTI which we will check UA and culture.  But we will hold the CT imaging for this time.  Will reassess after and if patient is feeling improved we will likely discharge with p.o. medications and recommend strict follow-up with her GI doctor for possibly earlier colonoscopy.  Patient and  are amenable to plan.

## 2024-05-27 NOTE — ED PROVIDER NOTE - NSFOLLOWUPINSTRUCTIONS_ED_ALL_ED_FT
You were seen in the emergency department today for abdominal pain.    We obtained blood test which were unrevealing    We provided you with medications including Toradol and Zofran.  Toradol is for pain and Zofran is for antinausea.    The goal of your condition at this time is to follow-up with a gastroenterologist for your colonoscopy where they can diagnose if you have diverticulitis or diverticulosis and how to treat her properly.    If your condition worsens such as severe pain that is not tolerable despite medications, you start spiking a fever or you start vomiting and not able to hold down any fluids or foods please come back to the emergency room soon as possible for further evaluation and treatment.      If this is diverticulitis which we were not able to definitively diagnose today because you do not want the CT scan there is a chance that it could perforate which will require emergency surgery so if you have any of the above symptoms and her concern for perforation please return to the emergency department as soon as possible.  Otherwise take pain medications as needed and make sure you are drinking and eating as able to stay hydrated.

## 2024-05-27 NOTE — ED PROVIDER NOTE - ATTENDING CONTRIBUTION TO CARE
I, Dr Bran Jiang wrote the initial note in its entirety and the resident only contributed to the progress note and disposition with which I agree.

## 2024-05-27 NOTE — ED ADULT NURSE NOTE - OBJECTIVE STATEMENT
Pt is A&O x 4, ambulatory w/o assistance, shows no signs of acute distress. Presents to the ED w/ lower abdominal pain that radiates to her back. Hx of diverticulitis. States she was recently prescribed a 12 day course of anti-bx which she finished, but discomfort persists. Also endorsing nausea and decreased appetite. Denies episodes of vomitus, diarrhea, dizziness/vision changes, fevers/chill, SOB or chest discomfort. VSS. Respirations even and unlabored. Right Ac 20 gauge IV line placed and labs drawn. Medications administered. Pending lab results and reassessment.

## 2024-05-27 NOTE — ED ADULT TRIAGE NOTE - CHIEF COMPLAINT QUOTE
Pt states, "I woke up with lower abdominal pain." Pt seen in ED for similar symptoms. Endorsing nausea, last bowel movement this morning with a few drops of blood. Pt denies taking blood thinners. Past medical history of diverticulosis

## 2024-05-27 NOTE — ED PROVIDER NOTE - PHYSICAL EXAMINATION
Anxious appearing abdomen is diffusely soft nontender no rebound no guarding no masses.  No  masses.  Negative Rocha's negative Clive's.  No CVA tenderness bilaterally.

## 2024-05-27 NOTE — ED PROVIDER NOTE - PATIENT PORTAL LINK FT
You can access the FollowMyHealth Patient Portal offered by [facility] by registering at the following website: [email or web address]. By joining [employer]’s FollowMyHealth portal, you will also be able to view your health information using other applications (apps) compatible with our system.

## 2024-05-27 NOTE — ED PROVIDER NOTE - OBJECTIVE STATEMENT
68-year-old female with history of bipolar disorder, hypertension and previous diverticulitis that was diagnosed April 2024 that is scheduled for a colonoscopy July 6 that presents with abdominal pain.  Patient states that she has had 1 day of lower abdominal pain and back pain which is similar to her previous presentation of diverticulitis.  She has followed with her PMD and finished a course of p.o. antibiotics Cipro and Flagyl x 12 days but still has discomfort.  States that she has been having constipation which is abnormal for her no bleeding.  Denies any urinary symptoms.  No fevers, chills but feels nauseous and not hungry as usual although still tolerating p.o. and able to hydrate today.  Denies smoking drinking or drugs.  No trauma no rashes.  Patient does not want another CT scan as she has had multiple CT scans in the past.

## 2024-05-27 NOTE — ED ADULT NURSE NOTE - NSFALLUNIVINTERV_ED_ALL_ED
Bed/Stretcher in lowest position, wheels locked, appropriate side rails in place/Call bell, personal items and telephone in reach/Instruct patient to call for assistance before getting out of bed/chair/stretcher/Non-slip footwear applied when patient is off stretcher/[place] to call system/Physically safe environment - no spills, clutter or unnecessary equipment/Purposeful proactive rounding/Room/bathroom lighting operational, light cord in reach

## 2024-05-28 LAB
CULTURE RESULTS: SIGNIFICANT CHANGE UP
SPECIMEN SOURCE: SIGNIFICANT CHANGE UP

## 2024-07-01 ENCOUNTER — RESULT REVIEW (OUTPATIENT)
Age: 69
End: 2024-07-01

## 2024-07-01 ENCOUNTER — APPOINTMENT (OUTPATIENT)
Dept: ULTRASOUND IMAGING | Facility: IMAGING CENTER | Age: 69
End: 2024-07-01
Payer: MEDICARE

## 2024-07-01 ENCOUNTER — OUTPATIENT (OUTPATIENT)
Dept: OUTPATIENT SERVICES | Facility: HOSPITAL | Age: 69
LOS: 1 days | End: 2024-07-01
Payer: MEDICARE

## 2024-07-01 ENCOUNTER — APPOINTMENT (OUTPATIENT)
Dept: MAMMOGRAPHY | Facility: IMAGING CENTER | Age: 69
End: 2024-07-01
Payer: MEDICARE

## 2024-07-01 DIAGNOSIS — C50.912 MALIGNANT NEOPLASM OF UNSPECIFIED SITE OF LEFT FEMALE BREAST: ICD-10-CM

## 2024-07-01 DIAGNOSIS — Z98.89 OTHER SPECIFIED POSTPROCEDURAL STATES: Chronic | ICD-10-CM

## 2024-07-01 PROCEDURE — 77063 BREAST TOMOSYNTHESIS BI: CPT | Mod: 26

## 2024-07-01 PROCEDURE — 77067 SCR MAMMO BI INCL CAD: CPT | Mod: 26

## 2024-07-01 PROCEDURE — 76536 US EXAM OF HEAD AND NECK: CPT | Mod: 26

## 2024-07-01 PROCEDURE — 77063 BREAST TOMOSYNTHESIS BI: CPT

## 2024-07-01 PROCEDURE — 76536 US EXAM OF HEAD AND NECK: CPT

## 2024-07-01 PROCEDURE — 76641 ULTRASOUND BREAST COMPLETE: CPT

## 2024-07-01 PROCEDURE — 77067 SCR MAMMO BI INCL CAD: CPT

## 2024-07-01 PROCEDURE — 76641 ULTRASOUND BREAST COMPLETE: CPT | Mod: 26,50,GY

## 2024-07-09 ENCOUNTER — NON-APPOINTMENT (OUTPATIENT)
Age: 69
End: 2024-07-09

## 2024-08-13 ENCOUNTER — NON-APPOINTMENT (OUTPATIENT)
Age: 69
End: 2024-08-13

## 2024-10-29 NOTE — ED ADULT TRIAGE NOTE - INTERNATIONAL TRAVEL
Pt zen care performed and purewick adjusted. Pt tolerating without difficulties. Awaiting physician reeval. Denies any nausea at this time, no pain. Will continue to monitor.   No

## 2024-12-03 ENCOUNTER — APPOINTMENT (OUTPATIENT)
Dept: PLASTIC SURGERY | Facility: CLINIC | Age: 69
End: 2024-12-03
Payer: MEDICARE

## 2024-12-03 VITALS
SYSTOLIC BLOOD PRESSURE: 174 MMHG | DIASTOLIC BLOOD PRESSURE: 91 MMHG | BODY MASS INDEX: 27.01 KG/M2 | OXYGEN SATURATION: 99 % | HEIGHT: 59 IN | TEMPERATURE: 206.78 F | HEART RATE: 79 BPM | WEIGHT: 134 LBS

## 2024-12-03 DIAGNOSIS — N64.4 MASTODYNIA: ICD-10-CM

## 2024-12-03 DIAGNOSIS — C50.912 MALIGNANT NEOPLASM OF UNSPECIFIED SITE OF LEFT FEMALE BREAST: ICD-10-CM

## 2024-12-03 PROCEDURE — 99213 OFFICE O/P EST LOW 20 MIN: CPT

## 2024-12-04 ENCOUNTER — APPOINTMENT (OUTPATIENT)
Dept: ULTRASOUND IMAGING | Facility: IMAGING CENTER | Age: 69
End: 2024-12-04

## 2024-12-04 ENCOUNTER — OUTPATIENT (OUTPATIENT)
Dept: OUTPATIENT SERVICES | Facility: HOSPITAL | Age: 69
LOS: 1 days | End: 2024-12-04
Payer: MEDICARE

## 2024-12-04 DIAGNOSIS — Z98.89 OTHER SPECIFIED POSTPROCEDURAL STATES: Chronic | ICD-10-CM

## 2024-12-04 DIAGNOSIS — E04.2 NONTOXIC MULTINODULAR GOITER: ICD-10-CM

## 2024-12-04 PROCEDURE — 76536 US EXAM OF HEAD AND NECK: CPT

## 2024-12-04 PROCEDURE — 76536 US EXAM OF HEAD AND NECK: CPT | Mod: 26

## 2024-12-05 NOTE — ED ADULT NURSE NOTE - DOES PATIENT HAVE ADVANCE DIRECTIVE
Identified pt with two pt identifiers(name and )    Chief Complaint   Patient presents with    Shoulder Pain     Patient has shoulder pain in both shoulder for about a year now. Patient also has a spot on the right middle side of her back that is itching. fasting        Health Maintenance Due   Topic    Pneumococcal 65+ years Vaccine (1 of 2 - PCV)    DTaP/Tdap/Td vaccine (1 - Tdap)    Shingles vaccine (1 of 2)    Respiratory Syncytial Virus (RSV) Pregnant or age 60 yrs+ (1 - 1-dose 75+ series)    Diabetic Alb to Cr ratio (uACR) test     Flu vaccine (1)    COVID-19 Vaccine ( -  season)       Wt Readings from Last 3 Encounters:   24 88.9 kg (196 lb)   24 89.4 kg (197 lb)   10/04/24 89.4 kg (197 lb)     Temp Readings from Last 3 Encounters:   24 98 °F (36.7 °C) (Temporal)   24 99.4 °F (37.4 °C) (Oral)   07/10/24 99 °F (37.2 °C) (Oral)     BP Readings from Last 3 Encounters:   24 122/74   24 (!) 142/78   10/04/24 130/64     Pulse Readings from Last 3 Encounters:   24 81   24 80   10/04/24 80           Depression Screening:  :         2024    10:37 AM 2024     2:28 PM 3/21/2024    10:40 AM 2023     9:51 AM 10/5/2023     2:09 PM 2023    10:00 AM 3/9/2023     1:11 PM   PHQ-9 Questionaire   Little interest or pleasure in doing things 0 0 0 0 0 0 0   Feeling down, depressed, or hopeless 0 0 0 0 0 0 0   Trouble falling or staying asleep, or sleeping too much 0 0 0  0     Feeling tired or having little energy 0 0 3  0     Poor appetite or overeating 0 0 3  0     Feeling bad about yourself - or that you are a failure or have let yourself or your family down 0 0 0  0     Trouble concentrating on things, such as reading the newspaper or watching television 0 0 3  0     Moving or speaking so slowly that other people could have noticed. Or the opposite - being so fidgety or restless that you have been moving around a lot more than usual 0 0 0  0    relayed to patient when they become available.     Pt verbalizes understanding of plan of care and denies further questions or concerns at this time    Return if symptoms worsen or fail to improve.    SAMAN  Laurafreddie Lu is a 84 y.o. female presenting today for follow up of: shoulder pain and also having a rash on her upper back that is intensely itchy.   She also has a  has a past medical history of Arrhythmia, Arthritis, Burning with urination, Depression, Diabetes ([facility]), GERD (gastroesophageal reflux disease), Glaucoma, Hearing loss, High cholesterol, Hypertension, Ill-defined condition, Neuropathy, Obesity, Class II, BMI 35-39.9, Osteoarthritis, Type 2 diabetes mellitus without complication ([facility]), and Urinary incontinence.    Review of Systems   Constitutional:  Positive for activity change.   HENT: Negative.     Eyes: Negative.    Respiratory: Negative.     Cardiovascular: Negative.    Gastrointestinal: Negative.    Endocrine: Negative.    Genitourinary: Negative.    Musculoskeletal:  Positive for arthralgias, back pain, gait problem, myalgias and neck pain.   Skin:  Positive for rash.   Allergic/Immunologic: Negative.    Hematological: Negative.    Psychiatric/Behavioral: Negative.     All other systems reviewed and are negative.    HISTORICAL  PMH, PSH, FHX, SOCHX, ALLERGIES and MEDS were reviewed and updated today.    OBJECTIVE  Visit Vitals  /74 (Site: Right Upper Arm, Position: Sitting, Cuff Size: Medium Adult)   Pulse 81   Temp 98 °F (36.7 °C) (Temporal)   Resp 16   Ht 1.6 m (5' 3\")   Wt 88.9 kg (196 lb)   SpO2 96%   BMI 34.72 kg/m²     Physical Exam  Vitals and nursing note reviewed.   Constitutional:       Appearance: Normal appearance. She is obese.   Cardiovascular:      Rate and Rhythm: Normal rate and regular rhythm.      Pulses: Normal pulses.      Heart sounds: Normal heart sounds.   Pulmonary:      Effort: Pulmonary effort is normal.      Breath sounds: Normal breath sounds.  No

## 2025-01-07 ENCOUNTER — APPOINTMENT (OUTPATIENT)
Dept: SURGERY | Facility: CLINIC | Age: 70
End: 2025-01-07
Payer: MEDICARE

## 2025-01-07 DIAGNOSIS — E04.2 NONTOXIC MULTINODULAR GOITER: ICD-10-CM

## 2025-01-07 PROCEDURE — G2211 COMPLEX E/M VISIT ADD ON: CPT

## 2025-01-07 PROCEDURE — 99213 OFFICE O/P EST LOW 20 MIN: CPT

## 2025-01-08 ENCOUNTER — APPOINTMENT (OUTPATIENT)
Age: 70
End: 2025-01-08

## 2025-03-10 ENCOUNTER — NON-APPOINTMENT (OUTPATIENT)
Age: 70
End: 2025-03-10

## 2025-03-10 ENCOUNTER — OFFICE (OUTPATIENT)
Facility: LOCATION | Age: 70
Setting detail: OPHTHALMOLOGY
End: 2025-03-10
Payer: MEDICARE

## 2025-03-10 DIAGNOSIS — H05.811: ICD-10-CM

## 2025-03-10 PROBLEM — H01.004 BLEPHARITIS;  , RIGHT LOWER LID, RIGHT UPPER LID , LEFT UPPER LID, LEFT LOWER LID: Status: ACTIVE | Noted: 2025-03-10

## 2025-03-10 PROBLEM — H01.005 BLEPHARITIS;  , RIGHT LOWER LID, RIGHT UPPER LID , LEFT UPPER LID, LEFT LOWER LID: Status: ACTIVE | Noted: 2025-03-10

## 2025-03-10 PROBLEM — H01.002 BLEPHARITIS;  , RIGHT LOWER LID, RIGHT UPPER LID , LEFT UPPER LID, LEFT LOWER LID: Status: ACTIVE | Noted: 2025-03-10

## 2025-03-10 PROBLEM — H01.001 BLEPHARITIS;  , RIGHT LOWER LID, RIGHT UPPER LID , LEFT UPPER LID, LEFT LOWER LID: Status: ACTIVE | Noted: 2025-03-10

## 2025-03-10 PROCEDURE — 99213 OFFICE O/P EST LOW 20 MIN: CPT | Performed by: OPHTHALMOLOGY

## 2025-03-10 ASSESSMENT — REFRACTION_AUTOREFRACTION
OS_AXIS: 083
OS_SPHERE: -0.50
OD_SPHERE: -0.25
OS_CYLINDER: -0.50
OD_AXIS: 056
OD_CYLINDER: -0.50

## 2025-03-10 ASSESSMENT — LID EXAM ASSESSMENTS
OD_BLEPHARITIS: RLL RUL 1+ 2+
OS_BLEPHARITIS: LLL LUL 1+ 2+

## 2025-03-10 ASSESSMENT — KERATOMETRY
OS_AXISANGLE_DEGREES: 094
METHOD_AUTO_MANUAL: AUTO
OD_K2POWER_DIOPTERS: 46.25
OD_AXISANGLE_DEGREES: 109
OD_K1POWER_DIOPTERS: 45.50
OS_K1POWER_DIOPTERS: 46.25
OS_K2POWER_DIOPTERS: 46.75

## 2025-03-10 ASSESSMENT — VISUAL ACUITY
OS_BCVA: 20/20-
OD_BCVA: 20/20-

## 2025-03-10 ASSESSMENT — LACRIMAL DUCT - ASSESSMENT: OD_LACRIMAL_DUCT: PASSAGEWAY OPEN UPON IRRIGATION.

## 2025-04-23 ENCOUNTER — APPOINTMENT (OUTPATIENT)
Dept: RADIATION ONCOLOGY | Facility: CLINIC | Age: 70
End: 2025-04-23
Payer: MEDICARE

## 2025-04-23 VITALS
BODY MASS INDEX: 25.78 KG/M2 | WEIGHT: 127.87 LBS | SYSTOLIC BLOOD PRESSURE: 168 MMHG | HEART RATE: 72 BPM | OXYGEN SATURATION: 100 % | HEIGHT: 59 IN | RESPIRATION RATE: 17 BRPM | DIASTOLIC BLOOD PRESSURE: 111 MMHG

## 2025-04-23 DIAGNOSIS — C50.912 MALIGNANT NEOPLASM OF UNSPECIFIED SITE OF LEFT FEMALE BREAST: ICD-10-CM

## 2025-04-23 PROCEDURE — 99212 OFFICE O/P EST SF 10 MIN: CPT

## 2025-07-01 ENCOUNTER — RESULT REVIEW (OUTPATIENT)
Age: 70
End: 2025-07-01

## 2025-07-01 ENCOUNTER — OUTPATIENT (OUTPATIENT)
Dept: OUTPATIENT SERVICES | Facility: HOSPITAL | Age: 70
LOS: 1 days | End: 2025-07-01
Payer: MEDICARE

## 2025-07-01 ENCOUNTER — APPOINTMENT (OUTPATIENT)
Dept: ULTRASOUND IMAGING | Facility: IMAGING CENTER | Age: 70
End: 2025-07-01
Payer: MEDICARE

## 2025-07-01 ENCOUNTER — APPOINTMENT (OUTPATIENT)
Dept: MAMMOGRAPHY | Facility: IMAGING CENTER | Age: 70
End: 2025-07-01
Payer: MEDICARE

## 2025-07-01 DIAGNOSIS — Z98.89 OTHER SPECIFIED POSTPROCEDURAL STATES: Chronic | ICD-10-CM

## 2025-07-01 DIAGNOSIS — Z00.00 ENCOUNTER FOR GENERAL ADULT MEDICAL EXAMINATION WITHOUT ABNORMAL FINDINGS: ICD-10-CM

## 2025-07-01 PROCEDURE — 77063 BREAST TOMOSYNTHESIS BI: CPT

## 2025-07-01 PROCEDURE — 77067 SCR MAMMO BI INCL CAD: CPT

## 2025-07-01 PROCEDURE — 76641 ULTRASOUND BREAST COMPLETE: CPT | Mod: 26,50,GA

## 2025-07-01 PROCEDURE — 77063 BREAST TOMOSYNTHESIS BI: CPT | Mod: 26,XU

## 2025-07-01 PROCEDURE — 77065 DX MAMMO INCL CAD UNI: CPT

## 2025-07-01 PROCEDURE — 77067 SCR MAMMO BI INCL CAD: CPT | Mod: 26,XU

## 2025-07-01 PROCEDURE — 77065 DX MAMMO INCL CAD UNI: CPT | Mod: 26,GG,RT

## 2025-07-01 PROCEDURE — 76641 ULTRASOUND BREAST COMPLETE: CPT

## 2025-07-09 ENCOUNTER — RESULT REVIEW (OUTPATIENT)
Age: 70
End: 2025-07-09

## 2025-07-09 ENCOUNTER — OUTPATIENT (OUTPATIENT)
Dept: OUTPATIENT SERVICES | Facility: HOSPITAL | Age: 70
LOS: 1 days | End: 2025-07-09
Payer: MEDICARE

## 2025-07-09 ENCOUNTER — APPOINTMENT (OUTPATIENT)
Dept: ULTRASOUND IMAGING | Facility: IMAGING CENTER | Age: 70
End: 2025-07-09
Payer: MEDICARE

## 2025-07-09 DIAGNOSIS — Z98.89 OTHER SPECIFIED POSTPROCEDURAL STATES: Chronic | ICD-10-CM

## 2025-07-09 DIAGNOSIS — R92.8 OTHER ABNORMAL AND INCONCLUSIVE FINDINGS ON DIAGNOSTIC IMAGING OF BREAST: ICD-10-CM

## 2025-07-09 PROCEDURE — 77065 DX MAMMO INCL CAD UNI: CPT

## 2025-07-09 PROCEDURE — 88360 TUMOR IMMUNOHISTOCHEM/MANUAL: CPT

## 2025-07-09 PROCEDURE — 19083 BX BREAST 1ST LESION US IMAG: CPT | Mod: LT

## 2025-07-09 PROCEDURE — A4648: CPT

## 2025-07-09 PROCEDURE — 88305 TISSUE EXAM BY PATHOLOGIST: CPT | Mod: 26

## 2025-07-09 PROCEDURE — 19083 BX BREAST 1ST LESION US IMAG: CPT

## 2025-07-09 PROCEDURE — 88360 TUMOR IMMUNOHISTOCHEM/MANUAL: CPT | Mod: 26

## 2025-07-09 PROCEDURE — 88305 TISSUE EXAM BY PATHOLOGIST: CPT

## 2025-07-09 PROCEDURE — 77065 DX MAMMO INCL CAD UNI: CPT | Mod: 26,LT

## 2025-07-14 LAB — SURGICAL PATHOLOGY STUDY: SIGNIFICANT CHANGE UP

## 2025-07-15 ENCOUNTER — APPOINTMENT (OUTPATIENT)
Dept: SURGERY | Facility: CLINIC | Age: 70
End: 2025-07-15
Payer: MEDICARE

## 2025-07-15 ENCOUNTER — NON-APPOINTMENT (OUTPATIENT)
Age: 70
End: 2025-07-15

## 2025-07-15 PROCEDURE — G2211 COMPLEX E/M VISIT ADD ON: CPT

## 2025-07-15 PROCEDURE — 99213 OFFICE O/P EST LOW 20 MIN: CPT

## 2025-07-17 ENCOUNTER — NON-APPOINTMENT (OUTPATIENT)
Age: 70
End: 2025-07-17

## 2025-07-22 ENCOUNTER — NON-APPOINTMENT (OUTPATIENT)
Age: 70
End: 2025-07-22

## 2025-07-22 ENCOUNTER — APPOINTMENT (OUTPATIENT)
Dept: PLASTIC SURGERY | Facility: CLINIC | Age: 70
End: 2025-07-22
Payer: MEDICARE

## 2025-07-22 VITALS
SYSTOLIC BLOOD PRESSURE: 179 MMHG | WEIGHT: 117 LBS | OXYGEN SATURATION: 95 % | TEMPERATURE: 207.5 F | DIASTOLIC BLOOD PRESSURE: 83 MMHG | HEIGHT: 59 IN | HEART RATE: 69 BPM | BODY MASS INDEX: 23.59 KG/M2 | RESPIRATION RATE: 16 BRPM

## 2025-07-22 PROCEDURE — 99215 OFFICE O/P EST HI 40 MIN: CPT

## 2025-07-22 PROCEDURE — G2212 PROLONG OUTPT/OFFICE VIS: CPT

## 2025-07-25 ENCOUNTER — APPOINTMENT (OUTPATIENT)
Dept: MRI IMAGING | Facility: IMAGING CENTER | Age: 70
End: 2025-07-25
Payer: MEDICARE

## 2025-07-25 ENCOUNTER — OUTPATIENT (OUTPATIENT)
Dept: OUTPATIENT SERVICES | Facility: HOSPITAL | Age: 70
LOS: 1 days | End: 2025-07-25
Payer: MEDICARE

## 2025-07-25 DIAGNOSIS — Z98.89 OTHER SPECIFIED POSTPROCEDURAL STATES: Chronic | ICD-10-CM

## 2025-07-25 DIAGNOSIS — C50.912 MALIGNANT NEOPLASM OF UNSPECIFIED SITE OF LEFT FEMALE BREAST: ICD-10-CM

## 2025-07-25 PROCEDURE — A9585: CPT

## 2025-07-25 PROCEDURE — 77049 MRI BREAST C-+ W/CAD BI: CPT | Mod: 26

## 2025-07-25 PROCEDURE — C8937: CPT

## 2025-07-25 PROCEDURE — C8908: CPT

## 2025-07-29 ENCOUNTER — NON-APPOINTMENT (OUTPATIENT)
Age: 70
End: 2025-07-29

## 2025-07-30 ENCOUNTER — NON-APPOINTMENT (OUTPATIENT)
Age: 70
End: 2025-07-30

## 2025-07-31 ENCOUNTER — NON-APPOINTMENT (OUTPATIENT)
Age: 70
End: 2025-07-31

## 2025-08-01 ENCOUNTER — NON-APPOINTMENT (OUTPATIENT)
Age: 70
End: 2025-08-01

## 2025-08-01 ENCOUNTER — APPOINTMENT (OUTPATIENT)
Dept: PLASTIC SURGERY | Facility: CLINIC | Age: 70
End: 2025-08-01
Payer: MEDICARE

## 2025-08-01 VITALS
WEIGHT: 118 LBS | HEART RATE: 68 BPM | BODY MASS INDEX: 23.16 KG/M2 | OXYGEN SATURATION: 99 % | SYSTOLIC BLOOD PRESSURE: 153 MMHG | HEIGHT: 60 IN | DIASTOLIC BLOOD PRESSURE: 80 MMHG

## 2025-08-01 DIAGNOSIS — C50.912 MALIGNANT NEOPLASM OF UNSPECIFIED SITE OF LEFT FEMALE BREAST: ICD-10-CM

## 2025-08-01 DIAGNOSIS — N64.81 PTOSIS OF BREAST: ICD-10-CM

## 2025-08-01 PROCEDURE — 99205 OFFICE O/P NEW HI 60 MIN: CPT

## 2025-08-03 PROBLEM — N64.81 PTOSIS OF BOTH BREASTS: Status: ACTIVE | Noted: 2025-08-03

## 2025-08-05 ENCOUNTER — NON-APPOINTMENT (OUTPATIENT)
Age: 70
End: 2025-08-05

## 2025-08-05 DIAGNOSIS — C50.212 MALIGNANT NEOPLASM OF UPPER-INNER QUADRANT OF LEFT FEMALE BREAST: ICD-10-CM

## 2025-08-13 ENCOUNTER — OUTPATIENT (OUTPATIENT)
Dept: OUTPATIENT SERVICES | Facility: HOSPITAL | Age: 70
LOS: 1 days | End: 2025-08-13
Payer: MEDICARE

## 2025-08-13 ENCOUNTER — APPOINTMENT (OUTPATIENT)
Dept: ULTRASOUND IMAGING | Facility: IMAGING CENTER | Age: 70
End: 2025-08-13
Payer: MEDICARE

## 2025-08-13 DIAGNOSIS — C50.212 MALIGNANT NEOPLASM OF UPPER-INNER QUADRANT OF LEFT FEMALE BREAST: ICD-10-CM

## 2025-08-13 DIAGNOSIS — Z98.89 OTHER SPECIFIED POSTPROCEDURAL STATES: Chronic | ICD-10-CM

## 2025-08-13 PROCEDURE — 19285 PERQ DEV BREAST 1ST US IMAG: CPT | Mod: LT

## 2025-08-13 PROCEDURE — 19285 PERQ DEV BREAST 1ST US IMAG: CPT

## 2025-08-13 PROCEDURE — C1739: CPT

## 2025-08-14 ENCOUNTER — OUTPATIENT (OUTPATIENT)
Dept: OUTPATIENT SERVICES | Facility: HOSPITAL | Age: 70
LOS: 1 days | End: 2025-08-14
Payer: MEDICARE

## 2025-08-14 VITALS
DIASTOLIC BLOOD PRESSURE: 77 MMHG | OXYGEN SATURATION: 100 % | WEIGHT: 119.93 LBS | TEMPERATURE: 98 F | HEIGHT: 59 IN | HEART RATE: 57 BPM | SYSTOLIC BLOOD PRESSURE: 131 MMHG | RESPIRATION RATE: 16 BRPM

## 2025-08-14 DIAGNOSIS — Z98.49 CATARACT EXTRACTION STATUS, UNSPECIFIED EYE: Chronic | ICD-10-CM

## 2025-08-14 DIAGNOSIS — Z98.890 OTHER SPECIFIED POSTPROCEDURAL STATES: Chronic | ICD-10-CM

## 2025-08-14 DIAGNOSIS — C50.919 MALIGNANT NEOPLASM OF UNSPECIFIED SITE OF UNSPECIFIED FEMALE BREAST: ICD-10-CM

## 2025-08-14 DIAGNOSIS — Z01.818 ENCOUNTER FOR OTHER PREPROCEDURAL EXAMINATION: ICD-10-CM

## 2025-08-14 DIAGNOSIS — Z98.89 OTHER SPECIFIED POSTPROCEDURAL STATES: Chronic | ICD-10-CM

## 2025-08-14 DIAGNOSIS — C50.212 MALIGNANT NEOPLASM OF UPPER-INNER QUADRANT OF LEFT FEMALE BREAST: ICD-10-CM

## 2025-08-14 LAB
ANION GAP SERPL CALC-SCNC: 8 MMOL/L — SIGNIFICANT CHANGE UP (ref 5–17)
BUN SERPL-MCNC: 22 MG/DL — SIGNIFICANT CHANGE UP (ref 7–23)
CALCIUM SERPL-MCNC: 9.3 MG/DL — SIGNIFICANT CHANGE UP (ref 8.4–10.5)
CHLORIDE SERPL-SCNC: 105 MMOL/L — SIGNIFICANT CHANGE UP (ref 96–108)
CO2 SERPL-SCNC: 28 MMOL/L — SIGNIFICANT CHANGE UP (ref 22–31)
CREAT SERPL-MCNC: 0.61 MG/DL — SIGNIFICANT CHANGE UP (ref 0.5–1.3)
EGFR: 97 ML/MIN/1.73M2 — SIGNIFICANT CHANGE UP
EGFR: 97 ML/MIN/1.73M2 — SIGNIFICANT CHANGE UP
GLUCOSE SERPL-MCNC: 97 MG/DL — SIGNIFICANT CHANGE UP (ref 70–99)
HCT VFR BLD CALC: 41.6 % — SIGNIFICANT CHANGE UP (ref 34.5–45)
HGB BLD-MCNC: 13.4 G/DL — SIGNIFICANT CHANGE UP (ref 11.5–15.5)
MCHC RBC-ENTMCNC: 28.8 PG — SIGNIFICANT CHANGE UP (ref 27–34)
MCHC RBC-ENTMCNC: 32.2 G/DL — SIGNIFICANT CHANGE UP (ref 32–36)
MCV RBC AUTO: 89.5 FL — SIGNIFICANT CHANGE UP (ref 80–100)
NRBC BLD AUTO-RTO: 0 /100 WBCS — SIGNIFICANT CHANGE UP (ref 0–0)
PLATELET # BLD AUTO: 288 K/UL — SIGNIFICANT CHANGE UP (ref 150–400)
POTASSIUM SERPL-MCNC: 4.2 MMOL/L — SIGNIFICANT CHANGE UP (ref 3.5–5.3)
POTASSIUM SERPL-SCNC: 4.2 MMOL/L — SIGNIFICANT CHANGE UP (ref 3.5–5.3)
RBC # BLD: 4.65 M/UL — SIGNIFICANT CHANGE UP (ref 3.8–5.2)
RBC # FLD: 12.9 % — SIGNIFICANT CHANGE UP (ref 10.3–14.5)
SODIUM SERPL-SCNC: 141 MMOL/L — SIGNIFICANT CHANGE UP (ref 135–145)
WBC # BLD: 6.62 K/UL — SIGNIFICANT CHANGE UP (ref 3.8–10.5)
WBC # FLD AUTO: 6.62 K/UL — SIGNIFICANT CHANGE UP (ref 3.8–10.5)

## 2025-08-14 PROCEDURE — 93010 ELECTROCARDIOGRAM REPORT: CPT | Mod: NC

## 2025-08-14 PROCEDURE — G0463: CPT

## 2025-08-14 PROCEDURE — 93005 ELECTROCARDIOGRAM TRACING: CPT

## 2025-08-14 PROCEDURE — 85027 COMPLETE CBC AUTOMATED: CPT

## 2025-08-14 PROCEDURE — 80048 BASIC METABOLIC PNL TOTAL CA: CPT

## 2025-08-14 PROCEDURE — 36415 COLL VENOUS BLD VENIPUNCTURE: CPT

## 2025-08-18 ENCOUNTER — NON-APPOINTMENT (OUTPATIENT)
Age: 70
End: 2025-08-18

## 2025-08-19 ENCOUNTER — OUTPATIENT (OUTPATIENT)
Dept: OUTPATIENT SERVICES | Facility: HOSPITAL | Age: 70
LOS: 1 days | End: 2025-08-19
Payer: MEDICARE

## 2025-08-19 ENCOUNTER — TRANSCRIPTION ENCOUNTER (OUTPATIENT)
Age: 70
End: 2025-08-19

## 2025-08-19 ENCOUNTER — APPOINTMENT (OUTPATIENT)
Dept: PLASTIC SURGERY | Facility: HOSPITAL | Age: 70
End: 2025-08-19
Payer: MEDICARE

## 2025-08-19 ENCOUNTER — NON-APPOINTMENT (OUTPATIENT)
Age: 70
End: 2025-08-19

## 2025-08-19 ENCOUNTER — RESULT REVIEW (OUTPATIENT)
Age: 70
End: 2025-08-19

## 2025-08-19 VITALS
WEIGHT: 119.93 LBS | SYSTOLIC BLOOD PRESSURE: 138 MMHG | RESPIRATION RATE: 14 BRPM | HEART RATE: 56 BPM | DIASTOLIC BLOOD PRESSURE: 82 MMHG | OXYGEN SATURATION: 98 % | HEIGHT: 59 IN | TEMPERATURE: 98 F

## 2025-08-19 VITALS
HEART RATE: 63 BPM | RESPIRATION RATE: 17 BRPM | OXYGEN SATURATION: 98 % | SYSTOLIC BLOOD PRESSURE: 150 MMHG | DIASTOLIC BLOOD PRESSURE: 84 MMHG

## 2025-08-19 DIAGNOSIS — Z98.89 OTHER SPECIFIED POSTPROCEDURAL STATES: Chronic | ICD-10-CM

## 2025-08-19 DIAGNOSIS — C50.212 MALIGNANT NEOPLASM OF UPPER-INNER QUADRANT OF LEFT FEMALE BREAST: ICD-10-CM

## 2025-08-19 DIAGNOSIS — Z98.49 CATARACT EXTRACTION STATUS, UNSPECIFIED EYE: Chronic | ICD-10-CM

## 2025-08-19 DIAGNOSIS — Z98.890 OTHER SPECIFIED POSTPROCEDURAL STATES: Chronic | ICD-10-CM

## 2025-08-19 PROCEDURE — 19301 PARTIAL MASTECTOMY: CPT | Mod: LT

## 2025-08-19 PROCEDURE — 88304 TISSUE EXAM BY PATHOLOGIST: CPT

## 2025-08-19 PROCEDURE — 76098 X-RAY EXAM SURGICAL SPECIMEN: CPT | Mod: 26

## 2025-08-19 PROCEDURE — 88307 TISSUE EXAM BY PATHOLOGIST: CPT

## 2025-08-19 PROCEDURE — 76098 X-RAY EXAM SURGICAL SPECIMEN: CPT

## 2025-08-19 PROCEDURE — 88304 TISSUE EXAM BY PATHOLOGIST: CPT | Mod: 26

## 2025-08-19 PROCEDURE — 88307 TISSUE EXAM BY PATHOLOGIST: CPT | Mod: 26

## 2025-08-19 RX ORDER — HYDROMORPHONE/SOD CHLOR,ISO/PF 2 MG/10 ML
1 SYRINGE (ML) INJECTION
Refills: 0 | Status: DISCONTINUED | OUTPATIENT
Start: 2025-08-19 | End: 2025-08-19

## 2025-08-19 RX ORDER — BIOTIN 10 MG
1 TABLET ORAL
Refills: 0 | DISCHARGE

## 2025-08-19 RX ORDER — HYDROMORPHONE/SOD CHLOR,ISO/PF 2 MG/10 ML
0.5 SYRINGE (ML) INJECTION
Refills: 0 | Status: DISCONTINUED | OUTPATIENT
Start: 2025-08-19 | End: 2025-08-19

## 2025-08-19 RX ORDER — SODIUM CHLORIDE 9 G/1000ML
1000 INJECTION, SOLUTION INTRAVENOUS
Refills: 0 | Status: DISCONTINUED | OUTPATIENT
Start: 2025-08-19 | End: 2025-08-19

## 2025-08-19 RX ORDER — BRIMONIDINE TARTRATE 1.5 MG/ML
1 SOLUTION/ DROPS OPHTHALMIC
Refills: 0 | DISCHARGE

## 2025-08-19 RX ORDER — ONDANSETRON HCL/PF 4 MG/2 ML
4 VIAL (ML) INJECTION ONCE
Refills: 0 | Status: DISCONTINUED | OUTPATIENT
Start: 2025-08-19 | End: 2025-08-19

## 2025-08-19 RX ORDER — ACETAMINOPHEN 500 MG/5ML
2 LIQUID (ML) ORAL
Qty: 0 | Refills: 0 | DISCHARGE

## 2025-08-19 RX ORDER — MELATONIN 5 MG
1 TABLET ORAL
Refills: 0 | DISCHARGE

## 2025-08-19 RX ADMIN — SODIUM CHLORIDE 50 MILLILITER(S): 9 INJECTION, SOLUTION INTRAVENOUS at 08:58

## 2025-08-26 ENCOUNTER — NON-APPOINTMENT (OUTPATIENT)
Age: 70
End: 2025-08-26

## 2025-08-26 LAB — SURGICAL PATHOLOGY STUDY: SIGNIFICANT CHANGE UP

## 2025-08-27 ENCOUNTER — APPOINTMENT (OUTPATIENT)
Dept: RADIATION ONCOLOGY | Facility: CLINIC | Age: 70
End: 2025-08-27

## 2025-08-27 ENCOUNTER — NON-APPOINTMENT (OUTPATIENT)
Age: 70
End: 2025-08-27

## 2025-08-27 VITALS
TEMPERATURE: 96.98 F | DIASTOLIC BLOOD PRESSURE: 81 MMHG | RESPIRATION RATE: 17 BRPM | OXYGEN SATURATION: 99 % | HEART RATE: 71 BPM | SYSTOLIC BLOOD PRESSURE: 153 MMHG | HEIGHT: 60 IN

## 2025-08-27 DIAGNOSIS — C50.912 MALIGNANT NEOPLASM OF UNSPECIFIED SITE OF LEFT FEMALE BREAST: ICD-10-CM

## 2025-08-27 PROBLEM — Z87.19 PERSONAL HISTORY OF OTHER DISEASES OF THE DIGESTIVE SYSTEM: Chronic | Status: ACTIVE | Noted: 2025-08-14

## 2025-08-27 PROBLEM — H04.123 DRY EYE SYNDROME OF BILATERAL LACRIMAL GLANDS: Chronic | Status: ACTIVE | Noted: 2025-08-14

## 2025-08-27 PROCEDURE — 99214 OFFICE O/P EST MOD 30 MIN: CPT

## 2025-09-03 ENCOUNTER — APPOINTMENT (OUTPATIENT)
Dept: PLASTIC SURGERY | Facility: CLINIC | Age: 70
End: 2025-09-03
Payer: MEDICARE

## 2025-09-03 ENCOUNTER — NON-APPOINTMENT (OUTPATIENT)
Age: 70
End: 2025-09-03

## 2025-09-03 VITALS
WEIGHT: 119 LBS | BODY MASS INDEX: 23.36 KG/M2 | SYSTOLIC BLOOD PRESSURE: 161 MMHG | HEIGHT: 60 IN | HEART RATE: 62 BPM | DIASTOLIC BLOOD PRESSURE: 91 MMHG | OXYGEN SATURATION: 99 % | TEMPERATURE: 94.41 F

## 2025-09-03 DIAGNOSIS — C50.212 MALIGNANT NEOPLASM OF UPPER-INNER QUADRANT OF LEFT FEMALE BREAST: ICD-10-CM

## 2025-09-03 PROCEDURE — 99024 POSTOP FOLLOW-UP VISIT: CPT

## 2025-09-11 ENCOUNTER — NON-APPOINTMENT (OUTPATIENT)
Age: 70
End: 2025-09-11

## 2025-09-19 ENCOUNTER — APPOINTMENT (OUTPATIENT)
Dept: HEMATOLOGY ONCOLOGY | Facility: CLINIC | Age: 70
End: 2025-09-19
Payer: MEDICARE

## 2025-09-19 VITALS
OXYGEN SATURATION: 99 % | WEIGHT: 120.37 LBS | HEIGHT: 60 IN | DIASTOLIC BLOOD PRESSURE: 84 MMHG | RESPIRATION RATE: 16 BRPM | HEART RATE: 66 BPM | BODY MASS INDEX: 23.63 KG/M2 | TEMPERATURE: 97.5 F | SYSTOLIC BLOOD PRESSURE: 134 MMHG

## 2025-09-19 PROCEDURE — G2211 COMPLEX E/M VISIT ADD ON: CPT

## 2025-09-19 PROCEDURE — 99215 OFFICE O/P EST HI 40 MIN: CPT

## 2025-09-23 PROBLEM — Z92.3 HISTORY OF RADIATION THERAPY: Status: ACTIVE | Noted: 2025-09-23

## (undated) DEVICE — DRAPE INSTRUMENT POUCH 6.75" X 11"

## (undated) DEVICE — SUT MONOCRYL 4-0 27" PS-2 UNDYED

## (undated) DEVICE — MEDICATION LABELS W MARKER

## (undated) DEVICE — WARMING BLANKET LOWER ADULT

## (undated) DEVICE — SOL IRR POUR NS 0.9% 500ML

## (undated) DEVICE — SUT SOFSILK 2-0 18" C-23

## (undated) DEVICE — PACK MINOR

## (undated) DEVICE — VENODYNE/SCD SLEEVE CALF MEDIUM

## (undated) DEVICE — DRSG CURITY GAUZE SPONGE 4 X 4" 12-PLY

## (undated) DEVICE — ELCTR STRYKER NEPTUNE SMOKE EVACUATION PENCIL (GREEN)

## (undated) DEVICE — SUT POLYSORB 2-0 30" GS-21 UNDYED

## (undated) DEVICE — DRAPE CHEST BREAST 106" X 122"

## (undated) DEVICE — SYR LUER LOK 10CC

## (undated) DEVICE — DRAPE TOWEL BLUE 17" X 24"

## (undated) DEVICE — GLV 7.5 PROTEXIS (CREAM) NEU-THERA

## (undated) DEVICE — DRSG TELFA 3 X 8

## (undated) DEVICE — SOL IRR POUR H2O 250ML

## (undated) DEVICE — SPECIMEN CONTAINER 100ML

## (undated) DEVICE — DRSG TEGADERM 1.75X1.75"

## (undated) DEVICE — SUT MONOCRYL 3-0 27" PS-2 UNDYED

## (undated) DEVICE — DRSG COMBINE 5 X 9"

## (undated) DEVICE — LAP PAD 18 X 18"

## (undated) DEVICE — DRAPE 1/2 SHEET 40X57"